# Patient Record
Sex: MALE | Race: OTHER | NOT HISPANIC OR LATINO | Employment: OTHER | URBAN - METROPOLITAN AREA
[De-identification: names, ages, dates, MRNs, and addresses within clinical notes are randomized per-mention and may not be internally consistent; named-entity substitution may affect disease eponyms.]

---

## 2018-03-01 ENCOUNTER — HOSPITAL ENCOUNTER (OUTPATIENT)
Dept: RADIOLOGY | Facility: HOSPITAL | Age: 58
Discharge: HOME | End: 2018-03-01
Attending: INTERNAL MEDICINE
Payer: COMMERCIAL

## 2018-03-01 ENCOUNTER — LAB (OUTPATIENT)
Dept: LAB | Facility: HOSPITAL | Age: 58
End: 2018-03-01
Attending: INTERNAL MEDICINE
Payer: COMMERCIAL

## 2018-03-01 ENCOUNTER — TRANSCRIBE ORDERS (OUTPATIENT)
Dept: ADMINISTRATIVE | Facility: HOSPITAL | Age: 58
End: 2018-03-01

## 2018-03-01 DIAGNOSIS — M54.9 BACK PAIN, UNSPECIFIED BACK LOCATION, UNSPECIFIED BACK PAIN LATERALITY, UNSPECIFIED CHRONICITY: ICD-10-CM

## 2018-03-01 DIAGNOSIS — M54.9 BACK PAIN, UNSPECIFIED BACK LOCATION, UNSPECIFIED BACK PAIN LATERALITY, UNSPECIFIED CHRONICITY: Primary | ICD-10-CM

## 2018-03-01 DIAGNOSIS — Z00.00 ROUTINE GENERAL MEDICAL EXAMINATION AT A HEALTH CARE FACILITY: ICD-10-CM

## 2018-03-01 DIAGNOSIS — D72.829 LEUKOCYTOSIS, UNSPECIFIED TYPE: ICD-10-CM

## 2018-03-01 DIAGNOSIS — M25.552 LEFT HIP PAIN: ICD-10-CM

## 2018-03-01 LAB
25(OH)D3 SERPL-MCNC: 17.4 NG/ML (ref 30–100)
ALBUMIN SERPL BCP-MCNC: 3.9 G/DL (ref 3.5–5)
ALP SERPL-CCNC: 79 U/L (ref 46–116)
ALT SERPL W P-5'-P-CCNC: 34 U/L (ref 12–78)
ANION GAP SERPL CALCULATED.3IONS-SCNC: 7 MMOL/L (ref 4–13)
AST SERPL W P-5'-P-CCNC: 25 U/L (ref 5–45)
BASOPHILS # BLD MANUAL: 0 THOUSAND/UL (ref 0–0.1)
BASOPHILS NFR MAR MANUAL: 0 % (ref 0–1)
BILIRUB SERPL-MCNC: 1.4 MG/DL (ref 0.2–1)
BUN SERPL-MCNC: 9 MG/DL (ref 5–25)
CALCIUM SERPL-MCNC: 8.9 MG/DL (ref 8.3–10.1)
CHLORIDE SERPL-SCNC: 103 MMOL/L (ref 100–108)
CHOLEST SERPL-MCNC: 215 MG/DL (ref 50–200)
CO2 SERPL-SCNC: 30 MMOL/L (ref 21–32)
CREAT SERPL-MCNC: 0.93 MG/DL (ref 0.6–1.3)
EOSINOPHIL # BLD MANUAL: 0 THOUSAND/UL (ref 0–0.4)
EOSINOPHIL NFR BLD MANUAL: 0 % (ref 0–6)
ERYTHROCYTE [DISTWIDTH] IN BLOOD BY AUTOMATED COUNT: 13 % (ref 11.6–15.1)
EST. AVERAGE GLUCOSE BLD GHB EST-MCNC: 108 MG/DL
GFR SERPL CREATININE-BSD FRML MDRD: 92 ML/MIN/1.73SQ M
GLUCOSE P FAST SERPL-MCNC: 95 MG/DL (ref 65–99)
HBA1C MFR BLD: 5.4 % (ref 4.2–6.3)
HCT VFR BLD AUTO: 44.9 % (ref 36.5–49.3)
HDLC SERPL-MCNC: 39 MG/DL (ref 40–60)
HGB BLD-MCNC: 15.3 G/DL (ref 12–17)
LDLC SERPL CALC-MCNC: 145 MG/DL (ref 0–100)
LG PLATELETS BLD QL SMEAR: PRESENT
LYMPHOCYTES # BLD AUTO: 18.4 THOUSAND/UL (ref 0.6–4.47)
LYMPHOCYTES # BLD AUTO: 73 % (ref 14–44)
MCH RBC QN AUTO: 29.1 PG (ref 26.8–34.3)
MCHC RBC AUTO-ENTMCNC: 34.1 G/DL (ref 31.4–37.4)
MCV RBC AUTO: 85 FL (ref 82–98)
MONOCYTES # BLD AUTO: 0.5 THOUSAND/UL (ref 0–1.22)
MONOCYTES NFR BLD: 2 % (ref 4–12)
NEUTROPHILS # BLD MANUAL: 4.03 THOUSAND/UL (ref 1.85–7.62)
NEUTS SEG NFR BLD AUTO: 16 % (ref 43–75)
OVALOCYTES BLD QL SMEAR: PRESENT
PLATELET # BLD AUTO: 194 THOUSANDS/UL (ref 149–390)
PLATELET BLD QL SMEAR: ADEQUATE
PMV BLD AUTO: 10.8 FL (ref 8.9–12.7)
POTASSIUM SERPL-SCNC: 4.3 MMOL/L (ref 3.5–5.3)
PROT SERPL-MCNC: 7.7 G/DL (ref 6.4–8.2)
RBC # BLD AUTO: 5.26 MILLION/UL (ref 3.88–5.62)
SODIUM SERPL-SCNC: 140 MMOL/L (ref 136–145)
TOTAL CELLS COUNTED SPEC: 100
TRIGL SERPL-MCNC: 153 MG/DL
VARIANT LYMPHS # BLD AUTO: 9 %
VIT B12 SERPL-MCNC: 413 PG/ML (ref 100–900)
WBC # BLD AUTO: 25.2 THOUSAND/UL (ref 4.31–10.16)

## 2018-03-01 PROCEDURE — 82306 VITAMIN D 25 HYDROXY: CPT

## 2018-03-01 PROCEDURE — 83036 HEMOGLOBIN GLYCOSYLATED A1C: CPT

## 2018-03-01 PROCEDURE — 80061 LIPID PANEL: CPT

## 2018-03-01 PROCEDURE — 36415 COLL VENOUS BLD VENIPUNCTURE: CPT

## 2018-03-01 PROCEDURE — 73522 X-RAY EXAM HIPS BI 3-4 VIEWS: CPT

## 2018-03-01 PROCEDURE — 80053 COMPREHEN METABOLIC PANEL: CPT

## 2018-03-01 PROCEDURE — 72110 X-RAY EXAM L-2 SPINE 4/>VWS: CPT

## 2018-03-01 PROCEDURE — 85007 BL SMEAR W/DIFF WBC COUNT: CPT

## 2018-03-01 PROCEDURE — 82607 VITAMIN B-12: CPT

## 2018-03-01 PROCEDURE — 85027 COMPLETE CBC AUTOMATED: CPT

## 2018-03-19 ENCOUNTER — APPOINTMENT (OUTPATIENT)
Dept: LAB | Facility: HOSPITAL | Age: 58
End: 2018-03-19
Attending: INTERNAL MEDICINE
Payer: COMMERCIAL

## 2018-03-19 ENCOUNTER — TRANSCRIBE ORDERS (OUTPATIENT)
Dept: ADMINISTRATIVE | Facility: HOSPITAL | Age: 58
End: 2018-03-19

## 2018-03-19 DIAGNOSIS — R23.0 CYANOSIS: ICD-10-CM

## 2018-03-19 DIAGNOSIS — D72.829 LEUKOCYTOSIS, UNSPECIFIED TYPE: ICD-10-CM

## 2018-03-19 DIAGNOSIS — D72.829 LEUKOCYTOSIS, UNSPECIFIED TYPE: Primary | ICD-10-CM

## 2018-03-19 DIAGNOSIS — R79.9 BLOOD CHEMISTRY ABNORMALITY: ICD-10-CM

## 2018-03-19 LAB
BASOPHILS # BLD MANUAL: 0 THOUSAND/UL (ref 0–0.1)
BASOPHILS NFR MAR MANUAL: 0 % (ref 0–1)
EOSINOPHIL # BLD MANUAL: 0 THOUSAND/UL (ref 0–0.4)
EOSINOPHIL NFR BLD MANUAL: 0 % (ref 0–6)
ERYTHROCYTE [DISTWIDTH] IN BLOOD BY AUTOMATED COUNT: 13.1 % (ref 11.6–15.1)
HCT VFR BLD AUTO: 45.1 % (ref 36.5–49.3)
HGB BLD-MCNC: 15.4 G/DL (ref 12–17)
LYMPHOCYTES # BLD AUTO: 22.14 THOUSAND/UL (ref 0.6–4.47)
LYMPHOCYTES # BLD AUTO: 81 % (ref 14–44)
MCH RBC QN AUTO: 28.9 PG (ref 26.8–34.3)
MCHC RBC AUTO-ENTMCNC: 34.1 G/DL (ref 31.4–37.4)
MCV RBC AUTO: 85 FL (ref 82–98)
MONOCYTES # BLD AUTO: 0.82 THOUSAND/UL (ref 0–1.22)
MONOCYTES NFR BLD: 3 % (ref 4–12)
NEUTROPHILS # BLD MANUAL: 4.37 THOUSAND/UL (ref 1.85–7.62)
NEUTS SEG NFR BLD AUTO: 16 % (ref 43–75)
PLATELET # BLD AUTO: 183 THOUSANDS/UL (ref 149–390)
PLATELET BLD QL SMEAR: ADEQUATE
PMV BLD AUTO: 11.5 FL (ref 8.9–12.7)
RBC # BLD AUTO: 5.32 MILLION/UL (ref 3.88–5.62)
RBC MORPH BLD: NORMAL
TOTAL CELLS COUNTED SPEC: 100
WBC # BLD AUTO: 27.33 THOUSAND/UL (ref 4.31–10.16)

## 2018-03-19 PROCEDURE — 36415 COLL VENOUS BLD VENIPUNCTURE: CPT

## 2018-03-19 PROCEDURE — 85007 BL SMEAR W/DIFF WBC COUNT: CPT

## 2018-03-19 PROCEDURE — 85027 COMPLETE CBC AUTOMATED: CPT

## 2018-03-23 ENCOUNTER — APPOINTMENT (OUTPATIENT)
Dept: LAB | Facility: HOSPITAL | Age: 58
End: 2018-03-23
Attending: INTERNAL MEDICINE
Payer: COMMERCIAL

## 2018-03-23 DIAGNOSIS — R23.0 CYANOSIS: ICD-10-CM

## 2018-03-23 DIAGNOSIS — D72.829 LEUKOCYTOSIS, UNSPECIFIED TYPE: ICD-10-CM

## 2018-03-23 DIAGNOSIS — R79.9 BLOOD CHEMISTRY ABNORMALITY: ICD-10-CM

## 2018-03-23 LAB
BASOPHILS # BLD MANUAL: 0 THOUSAND/UL (ref 0–0.1)
BASOPHILS NFR MAR MANUAL: 0 % (ref 0–1)
EOSINOPHIL # BLD MANUAL: 0 THOUSAND/UL (ref 0–0.4)
EOSINOPHIL NFR BLD MANUAL: 0 % (ref 0–6)
ERYTHROCYTE [DISTWIDTH] IN BLOOD BY AUTOMATED COUNT: 13 % (ref 11.6–15.1)
HCT VFR BLD AUTO: 44.1 % (ref 36.5–49.3)
HGB BLD-MCNC: 14.7 G/DL (ref 12–17)
LYMPHOCYTES # BLD AUTO: 23.96 THOUSAND/UL (ref 0.6–4.47)
LYMPHOCYTES # BLD AUTO: 85 % (ref 14–44)
MCH RBC QN AUTO: 28.9 PG (ref 26.8–34.3)
MCHC RBC AUTO-ENTMCNC: 33.3 G/DL (ref 31.4–37.4)
MCV RBC AUTO: 87 FL (ref 82–98)
MONOCYTES # BLD AUTO: 0 THOUSAND/UL (ref 0–1.22)
MONOCYTES NFR BLD: 0 % (ref 4–12)
NEUTROPHILS # BLD MANUAL: 3.66 THOUSAND/UL (ref 1.85–7.62)
NEUTS SEG NFR BLD AUTO: 13 % (ref 43–75)
NRBC BLD AUTO-RTO: 0 /100 WBCS
PLATELET # BLD AUTO: 184 THOUSANDS/UL (ref 149–390)
PLATELET BLD QL SMEAR: ADEQUATE
PMV BLD AUTO: 11.2 FL (ref 8.9–12.7)
RBC # BLD AUTO: 5.08 MILLION/UL (ref 3.88–5.62)
RBC MORPH BLD: NORMAL
TOTAL CELLS COUNTED SPEC: 100
VARIANT LYMPHS # BLD AUTO: 2 %
WBC # BLD AUTO: 28.19 THOUSAND/UL (ref 4.31–10.16)

## 2018-03-23 PROCEDURE — 88185 FLOWCYTOMETRY/TC ADD-ON: CPT

## 2018-03-23 PROCEDURE — 36415 COLL VENOUS BLD VENIPUNCTURE: CPT

## 2018-03-23 PROCEDURE — 88184 FLOWCYTOMETRY/ TC 1 MARKER: CPT

## 2018-03-23 PROCEDURE — 85027 COMPLETE CBC AUTOMATED: CPT

## 2018-03-23 PROCEDURE — 85007 BL SMEAR W/DIFF WBC COUNT: CPT

## 2018-03-26 ENCOUNTER — HOSPITAL ENCOUNTER (OUTPATIENT)
Dept: MRI IMAGING | Facility: HOSPITAL | Age: 58
Discharge: HOME | End: 2018-03-26
Attending: INTERNAL MEDICINE

## 2018-03-26 DIAGNOSIS — D72.829 LEUKOCYTOSIS, UNSPECIFIED TYPE: ICD-10-CM

## 2018-03-26 DIAGNOSIS — M25.552 LEFT HIP PAIN: ICD-10-CM

## 2018-03-29 LAB — SCAN RESULT: NORMAL

## 2018-04-02 ENCOUNTER — OFFICE VISIT (OUTPATIENT)
Dept: HEMATOLOGY ONCOLOGY | Facility: CLINIC | Age: 58
End: 2018-04-02
Payer: COMMERCIAL

## 2018-04-02 VITALS
RESPIRATION RATE: 16 BRPM | BODY MASS INDEX: 25.52 KG/M2 | OXYGEN SATURATION: 98 % | TEMPERATURE: 98.5 F | SYSTOLIC BLOOD PRESSURE: 138 MMHG | HEIGHT: 67 IN | HEART RATE: 64 BPM | WEIGHT: 162.6 LBS | DIASTOLIC BLOOD PRESSURE: 78 MMHG

## 2018-04-02 DIAGNOSIS — C91.10 CHRONIC LYMPHOCYTIC LEUKEMIA OF B-CELL TYPE NOT HAVING ACHIEVED REMISSION (HCC): Primary | ICD-10-CM

## 2018-04-02 PROCEDURE — 99245 OFF/OP CONSLTJ NEW/EST HI 55: CPT | Performed by: INTERNAL MEDICINE

## 2018-04-02 RX ORDER — ASPIRIN 81 MG/1
81 TABLET ORAL DAILY
COMMUNITY

## 2018-04-02 RX ORDER — ATENOLOL 25 MG/1
25 TABLET ORAL DAILY
COMMUNITY

## 2018-04-02 NOTE — LETTER
April 2, 2018     [address]  [address] [facility] [address]  [place] [address]  [address]    Patient: Dwight Kasper   YOB: 1960   Date of Visit: 4/2/2018       Dear Dr Alcantar Cristhian: Thank you for referring Dee Blank to me for evaluation  Below are my notes for this consultation  If you have questions, please do not hesitate to call me  I look forward to following your patient along with you  Sincerely,        Chavez Merlos MD        CC: No Recipients  Chavez Merlos MD  4/2/2018  6:11 PM  Sign at close encounter  Consultation - Medical Oncology   Dwight Kasper 56 y o  male MRN: [id number]  Unit/Bed#:  Encounter: [id number]      Reason for Consult:  Chronic lymphocytic leukemia  HPI: Dwight Kasper is a 56y o  year old male who presents with leukocytosis with lymphocytosis  On flow cytology has CD 19 positive, CD 20 positive and CD 23 positive B-cell CLL  This was an incidental finding  Patient is not symptomatic from this condition  Patient was seen by the physician for left hip pain that was going on off and on for the last 8 months  Activity will aggravate it  Rest will help  Patient had blood tests and MRI scan of left hip  Blood tests, CBC D and CMP favored CLL  MRI scan of left hip showed mild greater trochanteric bursitis  Previous history of hypertension, dyslipidemia and coronary artery disease  He has 1 stent in a small artery of the heart  He was controlling his blood pressure and dyslipidemia with diet but now is back on atenolol and he plans to take baby aspirin and if he takes Advil for his left hip that will not be within 4 hour of aspirin  Recent episode of throat infection that got better without antibiotics    ROS:  04/02/18 Reviewed 13 systems:  Presently no headaches, seizures, dizziness, diplopia, dysphagia, hoarseness, chest pain, palpitations, shortness of breath, cough, hemoptysis, abdominal pain, nausea, vomiting, change in bowel habits, melena, hematuria, fever, chills, bleeding, bone pains, skin rash, weight loss,   tiredness , weakness, numbness, claudication and gait problem  No frequent infections  No hot flashes  Not unusually sensitive to heat or cold  No swelling of the ankles  No swollen glands  Patient is anxious  Other symptoms are in HPI        Historical Information   Past Medical History:   Diagnosis Date    Abnormal coronary angiogram      History reviewed  No pertinent surgical history  Social History   History   Alcohol Use No     History   Drug Use No     History   Smoking Status    Never Smoker   Smokeless Tobacco    Never Used     Family History:   Family History   Problem Relation Age of Onset   [facility] Breast cancer Mother     Heart disease Mother     No Known Problems Father     Liver disease Brother          Current Outpatient Prescriptions:     aspirin (ECOTRIN LOW STRENGTH) 81 mg EC tablet, Take 81 mg by mouth daily, Disp: , Rfl:     atenolol (TENORMIN) 25 mg tablet, Take 25 mg by mouth daily, Disp: , Rfl:     cholecalciferol (VITAMIN D3) 1,000 units tablet, Take 1,000 Units by mouth daily, Disp: , Rfl:     No Known Allergies  @ ROS@  Physical Exam:  Vitals:    04/02/18 1600   BP: 138/78   BP Location: Left arm   Cuff Size: Adult   Pulse: 64   Resp: 16   Temp: 98 5 °F (36 9 °C)   TempSrc: Tympanic   SpO2: 98%   Weight: 73 8 kg (162 lb 9 6 oz)   Height: 5' 7" (1 702 m)     Alert, oriented, not in distress, no icterus, no oral thrush, no palpable neck mass, clear lung fields, regular heart rate, abdomen  soft and non tender, no palpable abdominal mass, no ascites, no edema of ankles, no calf tenderness, no focal neurological deficit, no skin rash, no palpable lymphadenopathy, good arterial pulses, no clubbing  Patient is anxious  Performance status 1  Lab Results: I have reviewed all pertinent labs    LABS:  Results for orders placed or performed in visit on 03/23/18   Leukemia/Lymphoma flow cytometry   Result Value Ref Range    Scan Result SEE WRITTEN REPORT    CBC and differential   Order: [id number]   Status:  Final result   Visible to patient:  No (Inaccessible in [address]) Next appt:  04/05/2018 at 09:30 AM in Radiology ([address] [place] [address]) Dx:  Cyanosis; Blood chemistry abnormality    Ref Range & Units 3/23/18 12:05 PM 3/19/18  9:57 AM 3/1/18  9:46 AM    WBC 4 31 - 10 16 Thousand/uL 28 19   27 33   25 20      RBC 3 88 - 5 62 Million/uL 5 08  5 32  5 26     Hemoglobin 12 0 - 17 0 g/dL 14 7  15 4  15 3     Hematocrit 36 5 - 49 3 % 44 1  45 1  44 9     MCV 82 - 98 fL 87  85  85     MCH 26 8 - 34 3 pg 28 9  28 9  29 1     MCHC 31 4 - 37 4 g/dL 33 3  34 1  34 1     RDW 11 6 - 15 1 % 13 0  13 1  13 0     MPV 8 9 - 12 7 fL 11 2  11 5  10 8     Platelets 770 - 677 Thousands/uL 184  183  194     nRBC /100 WBCs 0      Comments: This is an appended report  Jacy Alexander results have been appended to a previously preliminary verified report  Narrative     This is an appended report  Jacy Alexander results have been appended to a previously verified report  Specimen Collected: 03/23/18 12:05 PM Last Resulted: 03/23/18  1:17 PM                       Manual Differential(PHLEBS Do Not Order)   Order: [id number] - Reflex for Order [id number]   Status:  Final result   Visible to patient:  No (Inaccessible in [address]) Next appt:  04/05/2018 at 09:30 AM in Radiology ([address] [place] [address]) Dx:  Cyanosis; Blood chemistry abnormality         Ref Range & Units 3/23/18 12:05 PM 3/19/18  9:57 AM 3/1/18  9:46 AM    Segmented % 43 - 75 % 13   16   16      Lymphocytes % 14 - 44 % 85   81   73      Monocytes % 4 - 12 % 0   3   2      Eosinophils % 0 - 6 % 0  0  0     Basophils % 0 - 1 % 0  0  0     Atypical Lymphocytes % <=0 % 2    <=0 %" class="[id number]">  9      Absolute Neutrophils 1 85 - 7 62 Thousand/uL 3 66  4 37  4 03     Lymphocytes Absolute 0 60 - 4 47 Thousand/uL 23 96   22 14   18 40      Monocytes Absolute 0 00 - 1 22 Thousand/uL 0 00  0 82  0 50     Eosinophils Absolute 0 00 - 0 40 Thousand/uL 0 00  0 00  0 00     Basophils Absolute 0 00 - 0 10 Thousand/uL 0 00  0 00  0 00     Total Counted  100  100  100     RBC Morphology  Normal  Normal      Platelet Estimate Adequate  Adequate   Adequate   Adequate       Specimen Collected: 03/23/18 12:05 PM Last Resulted: 03/23/18  1:17 PM              Comprehensive metabolic panel   Order: [id number]   Status:  Final result   Visible to patient:  No (Inaccessible in [address]) Next appt:  04/05/2018 at 09:30 AM in Radiology ([address] [place] [address]) Dx:  Routine general medical examination a  Ref Range & Units 3/1/18  9:46 AM Flag   Sodium 136 - 145 mmol/L 140     Potassium 3 5 - 5 3 mmol/L 4 3     Chloride 100 - 108 mmol/L 103     CO2 21 - 32 mmol/L 30     Anion Gap 4 - 13 mmol/L 7     BUN 5 - 25 mg/dL 9     Creatinine 0 60 - 1 30 mg/dL 0 93     Comments: Standardized to IDMS reference method   Glucose, Fasting 65 - 99 mg/dL 95     Comments:    Specimen collection should occur prior to Sulfasalazine administration due to the potential for falsely depressed results  Specimen collection should occur prior to Sulfapyridine administration due to the potential for falsely elevated results  Calcium 8 3 - 10 1 mg/dL 8 9     AST 5 - 45 U/L 25     Comments:    Specimen collection should occur prior to Sulfasalazine administration due to the potential for falsely depressed results  ALT 12 - 78 U/L 34     Comments:    Specimen collection should occur prior to Sulfasalazine administration due to the potential for falsely depressed results  Alkaline Phosphatase 46 - 116 U/L 79     Total Protein 6 4 - 8 2 g/dL 7 7     Albumin 3 5 - 5 0 g/dL 3 9     Total Bilirubin 0 20 - 1 00 mg/dL 1 40   H    eGFR ml/min/1 73sq m 92                     Imaging Studies: I have personally reviewed pertinent reports  Pathology, and Other Studies: I have personally reviewed pertinent reports          Assessment and Plan:   Katharine Amaya is a 56y o  year old male who presents with leukocytosis with lymphocytosis  On flow cytology has CD 19 positive, CD 20 positive and CD 23 positive B-cell CLL  This was an incidental finding  Patient is not symptomatic from this condition  Patient was seen by the physician for left hip pain that was going on off and on for the last 8 months  Activity will aggravate it  Rest will help  Patient had blood tests and MRI scan of left hip  Blood tests, CBC D and CMP favored CLL  MRI scan of left hip showed mild greater trochanteric bursitis  Previous history of hypertension, dyslipidemia and coronary artery disease  He has 1 stent in a small artery of the heart  He was controlling his blood pressure and dyslipidemia with diet but now is back on atenolol and he plans to take baby aspirin and if he takes Advil for his left hip that will not be within 4 hour of aspirin  Recent episode of throat infection that got better without antibiotics  Physical examination and test results are as recorded and discussed  Patient has stage 0 CLL  Discussed stages of CLL  Discussed surveillance versus treatment  Discussed prognostic FISH panel  Patient will have additional blood tests as ordered below and also ultrasound of abdomen  Patient states he has undescended testicle and that was checked by a specialist couple of years ago and was told there was no cancer  He understands increased risk of cancer in undescended testicle  Present plan is surveillance for CLL  Condition and plan discussed  Questions answered  To be re-evaluated after test results that I ordered  1  Chronic lymphocytic leukemia of B-cell type not having achieved remission (HCC)    - Beta 2 microglobuline, serum; Future  - LD,Blood; Future  - Uric acid; Future  - CLL profile, fish  - US abdomen complete; Future  - Reticulocytes; Future  - Protein electrophoresis, serum; Future  - IgG, IgA, IgM; Future      Patient voiced understanding and agreement in the discussion  Counseling / Coordination of Care:  Spent 85 minutes with patient and his wife discussing total picture and providing support and counseling  Also coordination of care    Greater than 50% of total time was spent with the patient and / or family counseling and / or coordination of care

## 2018-04-02 NOTE — PROGRESS NOTES
Consultation - Medical Oncology   Kimberlyn Ray 56 y o  male MRN: [id number]  Unit/Bed#:  Encounter: [id number]      Reason for Consult:  Chronic lymphocytic leukemia  HPI: Kimberlyn Ray is a 56y o  year old male who presents with leukocytosis with lymphocytosis  On flow cytology has CD 19 positive, CD 20 positive and CD 23 positive B-cell CLL  This was an incidental finding  Patient is not symptomatic from this condition  Patient was seen by the physician for left hip pain that was going on off and on for the last 8 months  Activity will aggravate it  Rest will help  Patient had blood tests and MRI scan of left hip  Blood tests, CBC D and CMP favored CLL  MRI scan of left hip showed mild greater trochanteric bursitis  Previous history of hypertension, dyslipidemia and coronary artery disease  He has 1 stent in a small artery of the heart  He was controlling his blood pressure and dyslipidemia with diet but now is back on atenolol and he plans to take baby aspirin and if he takes Advil for his left hip that will not be within 4 hour of aspirin  Recent episode of throat infection that got better without antibiotics  ROS:  04/02/18 Reviewed 13 systems:  Presently no headaches, seizures, dizziness, diplopia, dysphagia, hoarseness, chest pain, palpitations, shortness of breath, cough, hemoptysis, abdominal pain, nausea, vomiting, change in bowel habits, melena, hematuria, fever, chills, bleeding, bone pains, skin rash, weight loss,   tiredness , weakness, numbness, claudication and gait problem  No frequent infections  No hot flashes  Not unusually sensitive to heat or cold  No swelling of the ankles  No swollen glands  Patient is anxious  Other symptoms are in HPI        Historical Information   Past Medical History:   Diagnosis Date    Abnormal coronary angiogram      History reviewed  No pertinent surgical history    Social History   History   Alcohol Use No     History   Drug Use No History   Smoking Status    Never Smoker   Smokeless Tobacco    Never Used     Family History:   Family History   Problem Relation Age of Onset   [facility] Breast [facility] Mother     Heart disease Mother     No Known Problems Father     Liver disease Brother          Current Outpatient Prescriptions:     aspirin (ECOTRIN LOW STRENGTH) 81 mg EC tablet, Take 81 mg by mouth daily, Disp: , Rfl:     atenolol (TENORMIN) 25 mg tablet, Take 25 mg by mouth daily, Disp: , Rfl:     cholecalciferol (VITAMIN D3) 1,000 units tablet, Take 1,000 Units by mouth daily, Disp: , Rfl:     No Known Allergies  @ ROS@  Physical Exam:  Vitals:    04/02/18 1600   BP: 138/78   BP Location: Left arm   Cuff Size: Adult   Pulse: 64   Resp: 16   Temp: 98 5 °F (36 9 °C)   TempSrc: Tympanic   SpO2: 98%   Weight: 73 8 kg (162 lb 9 6 oz)   Height: 5' 7" (1 702 m)     Alert, oriented, not in distress, no icterus, no oral thrush, no palpable neck mass, clear lung fields, regular heart rate, abdomen  soft and non tender, no palpable abdominal mass, no ascites, no edema of ankles, no calf tenderness, no focal neurological deficit, no skin rash, no palpable lymphadenopathy, good arterial pulses, no clubbing  Patient is anxious  Performance status 1  Lab Results: I have reviewed all pertinent labs  LABS:  Results for orders placed or performed in visit on 03/23/18   Leukemia/Lymphoma flow cytometry   Result Value Ref Range    Scan Result SEE WRITTEN REPORT    CBC and differential   Order: [id number]   Status:  Final result   Visible to patient:  No (Inaccessible in [address]) Next appt:  04/05/2018 at 09:30 AM in Radiology ([address] [place] [address]) Dx:  Cyanosis; Blood chemistry abnormality         Ref Range & Units 3/23/18 12:05 PM 3/19/18  9:57 AM 3/1/18  9:46 AM    WBC 4 31 - 10 16 Thousand/uL 28 19   27 33   25 20      RBC 3 88 - 5 62 Million/uL 5 08  5 32  5 26     Hemoglobin 12 0 - 17 0 g/dL 14 7  15 4  15 3     Hematocrit 36 5 - 49 3 % 44 1  45 1  44 9 MCV 82 - 98 fL 87  85  85     MCH 26 8 - 34 3 pg 28 9  28 9  29 1     MCHC 31 4 - 37 4 g/dL 33 3  34 1  34 1     RDW 11 6 - 15 1 % 13 0  13 1  13 0     MPV 8 9 - 12 7 fL 11 2  11 5  10 8     Platelets 623 - 259 Thousands/uL 184  183  194     nRBC /100 WBCs 0      Comments: This is an appended report  Orifavio Tommy results have been appended to a previously preliminary verified report  Narrative     This is an appended report  [facility] results have been appended to a previously verified report  Specimen Collected: 03/23/18 12:05 PM Last Resulted: 03/23/18  1:17 PM                       Manual Differential(PHLEBS Do Not Order)   Order: [id number] - Reflex for Order [id number]   Status:  Final result   Visible to patient:  No (Inaccessible in [address]) Next appt:  04/05/2018 at 09:30 AM in Radiology ([address] [place] [address]) Dx:  Cyanosis; Blood chemistry abnormality         Ref Range & Units 3/23/18 12:05 PM 3/19/18  9:57 AM 3/1/18  9:46 AM    Segmented % 43 - 75 % 13   16   16      Lymphocytes % 14 - 44 % 85   81   73      Monocytes % 4 - 12 % 0   3   2      Eosinophils % 0 - 6 % 0  0  0     Basophils % 0 - 1 % 0  0  0     Atypical Lymphocytes % <=0 % 2    <=0 %" class="rz_f [id number]">  9      Absolute Neutrophils 1 85 - 7 62 Thousand/uL 3 66  4 37  4 03     Lymphocytes Absolute 0 60 - 4 47 Thousand/uL 23 96   22 14   18 40      Monocytes Absolute 0 00 - 1 22 Thousand/uL 0 00  0 82  0 50     Eosinophils Absolute 0 00 - 0 40 Thousand/uL 0 00  0 00  0 00     Basophils Absolute 0 00 - 0 10 Thousand/uL 0 00  0 00  0 00     Total Counted  100  100  100     RBC Morphology  Normal  Normal      Platelet Estimate Adequate  Adequate   Adequate   Adequate       Specimen Collected: 03/23/18 12:05 PM Last Resulted: 03/23/18  1:17 PM              Comprehensive metabolic panel   Order: [id number]   Status:  Final result   Visible to patient:  No (Inaccessible in MyChart) Next appt:  04/05/2018 at 09:30 AM in Radiology ([address] [place] [address]) Dx:  Routine general medical examination a  Ref Range & Units 3/1/18  9:46 AM Flag   Sodium 136 - 145 mmol/L 140     Potassium 3 5 - 5 3 mmol/L 4 3     Chloride 100 - 108 mmol/L 103     CO2 21 - 32 mmol/L 30     Anion Gap 4 - 13 mmol/L 7     BUN 5 - 25 mg/dL 9     Creatinine 0 60 - 1 30 mg/dL 0 93     Comments: Standardized to IDMS reference method   Glucose, Fasting 65 - 99 mg/dL 95     Comments:    Specimen collection should occur prior to Sulfasalazine administration due to the potential for falsely depressed results  Specimen collection should occur prior to Sulfapyridine administration due to the potential for falsely elevated results  Calcium 8 3 - 10 1 mg/dL 8 9     AST 5 - 45 U/L 25     Comments:    Specimen collection should occur prior to Sulfasalazine administration due to the potential for falsely depressed results  ALT 12 - 78 U/L 34     Comments:    Specimen collection should occur prior to Sulfasalazine administration due to the potential for falsely depressed results  Alkaline Phosphatase 46 - 116 U/L 79     Total Protein 6 4 - 8 2 g/dL 7 7     Albumin 3 5 - 5 0 g/dL 3 9     Total Bilirubin 0 20 - 1 00 mg/dL 1 40   H    eGFR ml/min/1 73sq m 92                     Imaging Studies: I have personally reviewed pertinent reports  Pathology, and Other Studies: I have personally reviewed pertinent reports  Assessment and Plan:   Monika Montez is a 56y o  year old male who presents with leukocytosis with lymphocytosis  On flow cytology has CD 19 positive, CD 20 positive and CD 23 positive B-cell CLL  This was an incidental finding  Patient is not symptomatic from this condition  Patient was seen by the physician for left hip pain that was going on off and on for the last 8 months  Activity will aggravate it  Rest will help  Patient had blood tests and MRI scan of left hip  Blood tests, CBC D and CMP favored CLL  MRI scan of left hip showed mild greater trochanteric bursitis    Previous history of hypertension, dyslipidemia and coronary artery disease  He has 1 stent in a small artery of the heart  He was controlling his blood pressure and dyslipidemia with diet but now is back on atenolol and he plans to take baby aspirin and if he takes Advil for his left hip that will not be within 4 hour of aspirin  Recent episode of throat infection that got better without antibiotics  Physical examination and test results are as recorded and discussed  Patient has stage 0 CLL  Discussed stages of CLL  Discussed surveillance versus treatment  Discussed prognostic FISH panel  Patient will have additional blood tests as ordered below and also ultrasound of abdomen  Patient states he has undescended testicle and that was checked by a specialist couple of years ago and was told there was no cancer  He understands increased risk of cancer in undescended testicle  Present plan is surveillance for CLL  Condition and plan discussed  Questions answered  To be re-evaluated after test results that I ordered  1  Chronic lymphocytic leukemia of B-cell type not having achieved remission (HCC)    - Beta 2 microglobuline, serum; Future  - LD,Blood; Future  - Uric acid; Future  - CLL profile, fish  - US abdomen complete; Future  - Reticulocytes; Future  - Protein electrophoresis, serum; Future  - IgG, IgA, IgM; Future      Patient voiced understanding and agreement in the discussion  Counseling / Coordination of Care:  Spent 85 minutes with patient and his wife discussing total picture and providing support and counseling  Also coordination of care    Greater than 50% of total time was spent with the patient and / or family counseling and / or coordination of care

## 2018-04-03 ENCOUNTER — TELEPHONE (OUTPATIENT)
Dept: HEMATOLOGY ONCOLOGY | Facility: CLINIC | Age: 58
End: 2018-04-03

## 2018-04-05 ENCOUNTER — APPOINTMENT (OUTPATIENT)
Dept: LAB | Facility: HOSPITAL | Age: 58
End: 2018-04-05
Attending: INTERNAL MEDICINE
Payer: COMMERCIAL

## 2018-04-05 ENCOUNTER — HOSPITAL ENCOUNTER (OUTPATIENT)
Dept: ULTRASOUND IMAGING | Facility: HOSPITAL | Age: 58
Discharge: HOME | End: 2018-04-05
Attending: INTERNAL MEDICINE
Payer: COMMERCIAL

## 2018-04-05 DIAGNOSIS — C91.10 CHRONIC LYMPHOCYTIC LEUKEMIA OF B-CELL TYPE NOT HAVING ACHIEVED REMISSION (HCC): ICD-10-CM

## 2018-04-05 LAB
IGA SERPL-MCNC: 181 MG/DL (ref 70–400)
IGG SERPL-MCNC: 1460 MG/DL (ref 700–1600)
IGM SERPL-MCNC: 48 MG/DL (ref 40–230)
LDH SERPL-CCNC: 119 U/L (ref 81–234)
RETICS # AUTO: NORMAL 10*3/UL (ref 14356–105094)
RETICS # CALC: 1.02 % (ref 0.37–1.87)
URATE SERPL-MCNC: 7.2 MG/DL (ref 4.2–8)

## 2018-04-05 PROCEDURE — 82232 ASSAY OF BETA-2 PROTEIN: CPT

## 2018-04-05 PROCEDURE — 82784 ASSAY IGA/IGD/IGG/IGM EACH: CPT

## 2018-04-05 PROCEDURE — 84550 ASSAY OF BLOOD/URIC ACID: CPT

## 2018-04-05 PROCEDURE — 85045 AUTOMATED RETICULOCYTE COUNT: CPT

## 2018-04-05 PROCEDURE — 88367 INSITU HYBRIDIZATION AUTO: CPT | Performed by: INTERNAL MEDICINE

## 2018-04-05 PROCEDURE — 88373 M/PHMTRC ALYS ISHQUANT/SEMIQ: CPT | Performed by: INTERNAL MEDICINE

## 2018-04-05 PROCEDURE — 36415 COLL VENOUS BLD VENIPUNCTURE: CPT | Performed by: INTERNAL MEDICINE

## 2018-04-05 PROCEDURE — 76700 US EXAM ABDOM COMPLETE: CPT

## 2018-04-05 PROCEDURE — 84165 PROTEIN E-PHORESIS SERUM: CPT | Performed by: PATHOLOGY

## 2018-04-05 PROCEDURE — 84165 PROTEIN E-PHORESIS SERUM: CPT

## 2018-04-05 PROCEDURE — 83615 LACTATE (LD) (LDH) ENZYME: CPT

## 2018-04-07 LAB — B2 MICROGLOB SERPL-MCNC: 2 MG/L (ref 0.6–2.4)

## 2018-04-08 LAB
ALBUMIN SERPL ELPH-MCNC: 4.64 G/DL (ref 3.5–5)
ALBUMIN SERPL ELPH-MCNC: 61.9 % (ref 52–65)
ALPHA1 GLOB SERPL ELPH-MCNC: 0.2 G/DL (ref 0.1–0.4)
ALPHA1 GLOB SERPL ELPH-MCNC: 2.7 % (ref 2.5–5)
ALPHA2 GLOB SERPL ELPH-MCNC: 0.56 G/DL (ref 0.4–1.2)
ALPHA2 GLOB SERPL ELPH-MCNC: 7.4 % (ref 7–13)
BETA GLOB ABNORMAL SERPL ELPH-MCNC: 0.45 G/DL (ref 0.4–0.8)
BETA1 GLOB SERPL ELPH-MCNC: 6 % (ref 5–13)
BETA2 GLOB SERPL ELPH-MCNC: 5.7 % (ref 2–8)
BETA2+GAMMA GLOB SERPL ELPH-MCNC: 0.43 G/DL (ref 0.2–0.5)
GAMMA GLOB ABNORMAL SERPL ELPH-MCNC: 1.22 G/DL (ref 0.5–1.6)
GAMMA GLOB SERPL ELPH-MCNC: 16.3 % (ref 12–22)
IGG/ALB SER: 1.62 {RATIO} (ref 1.1–1.8)
PROT PATTERN SERPL ELPH-IMP: NORMAL
PROT SERPL-MCNC: 7.5 G/DL (ref 6.4–8.2)

## 2018-04-11 LAB — SCAN RESULT: NORMAL

## 2018-04-27 ENCOUNTER — TELEPHONE (OUTPATIENT)
Dept: HEMATOLOGY ONCOLOGY | Facility: CLINIC | Age: 58
End: 2018-04-27

## 2018-04-27 NOTE — TELEPHONE ENCOUNTER
Called and spoke to the patient regarding his blood work results from 4/5/18  All the patient's results were normal  Patient does not require anymore blood work orders prior to his 5/4/18 appointment  Patient verbally understood

## 2018-04-27 NOTE — TELEPHONE ENCOUNTER
Calling asking if lab that was done was OK  He has a follow up appointment in May, and wondered if anything needed to be repeated  He asked to speak with Dr Kathleen Perez

## 2018-05-02 ENCOUNTER — OFFICE VISIT (OUTPATIENT)
Dept: HEMATOLOGY ONCOLOGY | Facility: CLINIC | Age: 58
End: 2018-05-02
Payer: COMMERCIAL

## 2018-05-02 VITALS
RESPIRATION RATE: 15 BRPM | HEIGHT: 67 IN | TEMPERATURE: 97.7 F | WEIGHT: 157.4 LBS | OXYGEN SATURATION: 99 % | HEART RATE: 59 BPM | DIASTOLIC BLOOD PRESSURE: 74 MMHG | BODY MASS INDEX: 24.71 KG/M2 | SYSTOLIC BLOOD PRESSURE: 142 MMHG

## 2018-05-02 DIAGNOSIS — C91.10 CHRONIC LYMPHOCYTIC LEUKEMIA OF B-CELL TYPE NOT HAVING ACHIEVED REMISSION (HCC): Primary | ICD-10-CM

## 2018-05-02 PROCEDURE — 99214 OFFICE O/P EST MOD 30 MIN: CPT | Performed by: INTERNAL MEDICINE

## 2018-05-02 NOTE — PROGRESS NOTES
Follow-up visit  Nidia Horvath 56 y o  male MRN: [id number]  Unit/Bed#:  Encounter: [id number]        HPI:   Patient is here with his wife  Nidia Horvath is a 56y o  year old male  with the diagnosis of CLL  On fish test he has 17 P deletion  that is and unfavorable feature  He will be checked for Ig VH  He has some tiredness  Intentional weight loss because he is watching his diet  Patient was seen by the physician for left hip pain that was going on off and on for the last 8 months  Activity will aggravate it  Rest will help  Patient had blood tests and MRI scan of left hip  Blood tests, CBC D and CMP favored CLL  MRI scan of left hip showed mild greater trochanteric bursitis  Previous history of hypertension, dyslipidemia and coronary artery disease  He has 1 stent in a small artery of the heart  He was controlling his blood pressure and dyslipidemia with diet but now is back on atenolol and he plans to take baby aspirin and if he takes Advil for his left hip that will not be within 4 hour of aspirin  Recent episode of throat infection that got better without antibiotics  ROS:  05/02/18 Reviewed 13 systems:  Presently no headaches, seizures, dizziness, diplopia, dysphagia, hoarseness, chest pain, palpitations, shortness of breath, cough, hemoptysis, abdominal pain, nausea, vomiting, change in bowel habits, melena, hematuria, fever, chills, bleeding, bone pains, skin rash,  weakness, numbness, claudication and gait problem  No frequent infections  Not unusually sensitive to heat or cold  No swelling of the ankles  No swollen glands  Patient is anxious  Other symptoms are in HPI        Historical Information   Past Medical History:   Diagnosis Date    Abnormal coronary angiogram     Leukocytosis      History reviewed  No pertinent surgical history    Social History   History   Alcohol Use No     History   Drug Use No     History   Smoking Status    Never Smoker   Smokeless Tobacco  Never Used     Family History:   Family History   Problem Relation Age of Onset   24 [facility] Breast cancer Mother     Heart disease Mother     No Known Problems Father     Liver disease Brother          Current Outpatient Prescriptions:     aspirin (ECOTRIN LOW STRENGTH) 81 mg EC tablet, Take 81 mg by mouth daily, Disp: , Rfl:     atenolol (TENORMIN) 25 mg tablet, Take 25 mg by mouth daily, Disp: , Rfl:     cholecalciferol (VITAMIN D3) 1,000 units tablet, Take 1,000 Units by mouth daily, Disp: , Rfl:     No Known Allergies  @ ROS@  Physical Exam:  Vitals:    05/02/18 0800   BP: 142/74   BP Location: Left arm   Cuff Size: Adult   Pulse: 59   Resp: 15   Temp: 97 7 °F (36 5 °C)   TempSrc: Tympanic   SpO2: 99%   Weight: 71 4 kg (157 lb 6 4 oz)   Height: 5' 7" (1 702 m)     Alert, oriented, not in distress, no icterus, no oral thrush, no palpable neck mass, clear lung fields, regular heart rate, abdomen  soft and non tender, no palpable abdominal mass, no ascites, no edema of ankles, no calf tenderness, no focal neurological deficit, no skin rash, no palpable lymphadenopathy, good arterial pulses, no clubbing  Patient is anxious  Performance status 0  Lab Results: I have reviewed all pertinent labs    LABS:  Results for orders placed or performed in visit on 04/05/18   Beta 2 microglobuline, serum   Result Value Ref Range    Beta-2 Microglobulin 2 0 0 6 - 2 4 mg/L   LD,Blood   Result Value Ref Range     81 - 234 U/L   Uric acid   Result Value Ref Range    Uric Acid 7 2 4 2 - 8 0 mg/dL   Reticulocytes   Result Value Ref Range    Retic Ct Abs 52,700 14,356 - 105,094    Retic Ct Pct 1 02 0 37 - 1 87 %   Protein electrophoresis, serum   Result Value Ref Range    A/G Ratio 1 62 1 10 - 1 80    Albumin Electrophoresis 61 9 52 0 - 65 0 %    Albumin CONC 4 64 3 50 - 5 00 g/dl    Alpha 1 2 7 2 5 - 5 0 %    ALPHA 1 CONC 0 20 0 10 - 0 40 g/dL    Alpha 2 7 4 7 0 - 13 0 %    ALPHA 2 CONC 0 56 0 40 - 1 20 g/dL    Beta-1 6 0 5 0 - 13 0 %    BETA 1 CONC 0 45 0 40 - 0 80 g/dL    Beta-2 5 7 2 0 - 8 0 %    BETA 2 CONC 0 43 0 20 - 0 50 g/dL    Gamma Globulin 16 3 12 0 - 22 0 %    GAMMA CONC 1 22 0 50 - 1 60 g/dL    SPEP Interpretation       The serum total protein and albumin are within normal limits  No monoclonal bands noted  Reviewed by: Alex Stewart MD  ([id number])  **Electronic Signature**    Total Protein 7 5 6 4 - 8 2 g/dL   IgG, IgA, IgM   Result Value Ref Range     0 70 0 - 400 0 mg/dL    IGG 1,460 0 700 0 - 1,600 0 mg/dL    IGM 48 0 40 0 - 230 0 mg/dL         Imaging Studies: I have personally reviewed pertinent reports  Pathology, and Other Studies: I have personally reviewed pertinent reports  Assessment and Plan:  Patient is here with his wife  Michaelle Huggins is a 56y o  year old male  with the diagnosis of CLL  On fish test he has 17 P deletion  that is and unfavorable feature  He will be checked for Ig VH  He has some tiredness  Intentional weight loss because he is watching his diet  Patient was seen by the physician for left hip pain that was going on off and on for the last 8 months  Activity will aggravate it  Rest will help  Patient had blood tests and MRI scan of left hip  Blood tests, CBC D and CMP favored CLL  MRI scan of left hip showed mild greater trochanteric bursitis  Previous history of hypertension, dyslipidemia and coronary artery disease  He has 1 stent in a small artery of the heart  He was controlling his blood pressure and dyslipidemia with diet but now is back on atenolol and he plans to take baby aspirin and if he takes Advil for his left hip that will not be within 4 hour of aspirin  Recent episode of throat infection that got better without antibiotics     Physical examination and test results are as recorded and discussed  Explained 17 P deletion and on favorable feature  His disease will be monitored closely for that reason  Stage remains 0    Treatment is not indicated at this time  All discussed in detail  Questions answered  1  Chronic lymphocytic leukemia of B-cell type not having achieved remission (HCC)    - CBC and differential; Standing  - Beta 2 microglobuline, serum; Standing  - CBC and differential; Standing  - Comprehensive metabolic panel; Standing  - LD,Blood; Standing  - Uric acid; Standing  - MISCELLANEOUS LAB TEST  - CBC and differential  - Beta 2 microglobuline, serum  - CBC and differential  - Comprehensive metabolic panel  - LD,Blood  - Uric acid    Patient voiced understanding and agreement in the discussion  Counseling / Coordination of Care    Greater than 50% of total time was spent with the patient and / or family counseling and / or coordination of care

## 2018-05-02 NOTE — LETTER
May 2, 2018     [address]  [address] [facility] [address]  [place]  [address]    Patient: Dakota Crenshaw   YOB: 1960   Date of Visit: 5/2/2018       Dear Dr Deena Celaya: Thank you for referring Selina Clay to me for evaluation  Below are my notes for this consultation  If you have questions, please do not hesitate to call me  I look forward to following your patient along with you  Sincerely,        Nisha Augustin MD        CC: No Recipients  Nisha Augustin MD  5/2/2018  9:46 AM  Sign at close encounter    Follow-up visit  Dakota Crenshaw 56 y o  male MRN: [id number]  Unit/Bed#:  Encounter: [id number]        HPI:   Patient is here with his wife  Dakota Crenshaw is a 56y o  year old male  with the diagnosis of CLL  On fish test he has 17 P deletion  that is and unfavorable feature  He will be checked for Ig VH  He has some tiredness  Intentional weight loss because he is watching his diet  Patient was seen by the physician for left hip pain that was going on off and on for the last 8 months  Activity will aggravate it  Rest will help  Patient had blood tests and MRI scan of left hip  Blood tests, CBC D and CMP favored CLL  MRI scan of left hip showed mild greater trochanteric bursitis  Previous history of hypertension, dyslipidemia and coronary artery disease  He has 1 stent in a small artery of the heart  He was controlling his blood pressure and dyslipidemia with diet but now is back on atenolol and he plans to take baby aspirin and if he takes Advil for his left hip that will not be within 4 hour of aspirin  Recent episode of throat infection that got better without antibiotics       ROS:  05/02/18 Reviewed 13 systems:  Presently no headaches, seizures, dizziness, diplopia, dysphagia, hoarseness, chest pain, palpitations, shortness of breath, cough, hemoptysis, abdominal pain, nausea, vomiting, change in bowel habits, melena, hematuria, fever, chills, bleeding, bone pains, skin rash,  weakness, numbness, claudication and gait problem  No frequent infections  Not unusually sensitive to heat or cold  No swelling of the ankles  No swollen glands  Patient is anxious  Other symptoms are in HPI        Historical Information   Past Medical History:   Diagnosis Date    Abnormal coronary angiogram     Leukocytosis      History reviewed  No pertinent surgical history  Social History   History   Alcohol Use No     History   Drug Use No     History   Smoking Status    Never Smoker   Smokeless Tobacco    Never Used     Family History:   Family History   Problem Relation Age of Onset   Anoop Mena Breast cancer Mother     Heart disease Mother     No Known Problems Father     Liver disease Brother          Current Outpatient Prescriptions:     aspirin (ECOTRIN LOW STRENGTH) 81 mg EC tablet, Take 81 mg by mouth daily, Disp: , Rfl:     atenolol (TENORMIN) 25 mg tablet, Take 25 mg by mouth daily, Disp: , Rfl:     cholecalciferol (VITAMIN D3) 1,000 units tablet, Take 1,000 Units by mouth daily, Disp: , Rfl:     No Known Allergies  @ ROS@  Physical Exam:  Vitals:    05/02/18 0800   BP: 142/74   BP Location: Left arm   Cuff Size: Adult   Pulse: 59   Resp: 15   Temp: 97 7 °F (36 5 °C)   TempSrc: Tympanic   SpO2: 99%   Weight: 71 4 kg (157 lb 6 4 oz)   Height: 5' 7" (1 702 m)     Alert, oriented, not in distress, no icterus, no oral thrush, no palpable neck mass, clear lung fields, regular heart rate, abdomen  soft and non tender, no palpable abdominal mass, no ascites, no edema of ankles, no calf tenderness, no focal neurological deficit, no skin rash, no palpable lymphadenopathy, good arterial pulses, no clubbing  Patient is anxious  Performance status 0  Lab Results: I have reviewed all pertinent labs    LABS:  Results for orders placed or performed in visit on 04/05/18   Beta 2 microglobuline, serum   Result Value Ref Range    Beta-2 Microglobulin 2 0 0 6 - 2 4 mg/L LD,Blood   Result Value Ref Range     81 - 234 U/L   Uric acid   Result Value Ref Range    Uric Acid 7 2 4 2 - 8 0 mg/dL   Reticulocytes   Result Value Ref Range    Retic Ct Abs 52,700 14,356 - 105,094    Retic Ct Pct 1 02 0 37 - 1 87 %   Protein electrophoresis, serum   Result Value Ref Range    A/G Ratio 1 62 1 10 - 1 80    Albumin Electrophoresis 61 9 52 0 - 65 0 %    Albumin CONC 4 64 3 50 - 5 00 g/dl    Alpha 1 2 7 2 5 - 5 0 %    ALPHA 1 CONC 0 20 0 10 - 0 40 g/dL    Alpha 2 7 4 7 0 - 13 0 %    ALPHA 2 CONC 0 56 0 40 - 1 20 g/dL    Beta-1 6 0 5 0 - 13 0 %    BETA 1 CONC 0 45 0 40 - 0 80 g/dL    Beta-2 5 7 2 0 - 8 0 %    BETA 2 CONC 0 43 0 20 - 0 50 g/dL    Gamma Globulin 16 3 12 0 - 22 0 %    GAMMA CONC 1 22 0 50 - 1 60 g/dL    SPEP Interpretation       The serum total protein and albumin are within normal limits  No monoclonal bands noted  Reviewed by: Simran Donis MD  ([id number])  **Electronic Signature**    Total Protein 7 5 6 4 - 8 2 g/dL   IgG, IgA, IgM   Result Value Ref Range     0 70 0 - 400 0 mg/dL    IGG 1,460 0 700 0 - 1,600 0 mg/dL    IGM 48 0 40 0 - 230 0 mg/dL         Imaging Studies: I have personally reviewed pertinent reports  Pathology, and Other Studies: I have personally reviewed pertinent reports  Assessment and Plan:  Patient is here with his wife  Farhan Elliott is a 56y o  year old male  with the diagnosis of CLL  On fish test he has 17 P deletion  that is and unfavorable feature  He will be checked for Ig VH  He has some tiredness  Intentional weight loss because he is watching his diet  Patient was seen by the physician for left hip pain that was going on off and on for the last 8 months  Activity will aggravate it  Rest will help  Patient had blood tests and MRI scan of left hip  Blood tests, CBC D and CMP favored CLL  MRI scan of left hip showed mild greater trochanteric bursitis    Previous history of hypertension, dyslipidemia and coronary artery disease  He has 1 stent in a small artery of the heart  He was controlling his blood pressure and dyslipidemia with diet but now is back on atenolol and he plans to take baby aspirin and if he takes Advil for his left hip that will not be within 4 hour of aspirin  Recent episode of throat infection that got better without antibiotics     Physical examination and test results are as recorded and discussed  Explained 17 P deletion and on favorable feature  His disease will be monitored closely for that reason  Stage remains 0  Treatment is not indicated at this time  All discussed in detail  Questions answered  1  Chronic lymphocytic leukemia of B-cell type not having achieved remission (HCC)    - CBC and differential; Standing  - Beta 2 microglobuline, serum; Standing  - CBC and differential; Standing  - Comprehensive metabolic panel; Standing  - LD,Blood; Standing  - Uric acid; Standing  - MISCELLANEOUS LAB TEST  - CBC and differential  - Beta 2 microglobuline, serum  - CBC and differential  - Comprehensive metabolic panel  - LD,Blood  - Uric acid    Patient voiced understanding and agreement in the discussion  Counseling / Coordination of Care    Greater than 50% of total time was spent with the patient and / or family counseling and / or coordination of care

## 2018-05-08 ENCOUNTER — LAB (OUTPATIENT)
Dept: LAB | Facility: MEDICAL CENTER | Age: 58
End: 2018-05-08
Payer: COMMERCIAL

## 2018-05-08 LAB
ALBUMIN SERPL BCP-MCNC: 3.9 G/DL (ref 3.5–5)
ALP SERPL-CCNC: 76 U/L (ref 46–116)
ALT SERPL W P-5'-P-CCNC: 24 U/L (ref 12–78)
ANION GAP SERPL CALCULATED.3IONS-SCNC: 3 MMOL/L (ref 4–13)
AST SERPL W P-5'-P-CCNC: 15 U/L (ref 5–45)
BILIRUB SERPL-MCNC: 0.88 MG/DL (ref 0.2–1)
BUN SERPL-MCNC: 10 MG/DL (ref 5–25)
CALCIUM SERPL-MCNC: 9.1 MG/DL (ref 8.3–10.1)
CHLORIDE SERPL-SCNC: 105 MMOL/L (ref 100–108)
CO2 SERPL-SCNC: 30 MMOL/L (ref 21–32)
CREAT SERPL-MCNC: 0.88 MG/DL (ref 0.6–1.3)
GFR SERPL CREATININE-BSD FRML MDRD: 97 ML/MIN/1.73SQ M
GLUCOSE SERPL-MCNC: 103 MG/DL (ref 65–140)
LDH SERPL-CCNC: 112 U/L (ref 81–234)
POTASSIUM SERPL-SCNC: 4.6 MMOL/L (ref 3.5–5.3)
PROT SERPL-MCNC: 7.1 G/DL (ref 6.4–8.2)
SODIUM SERPL-SCNC: 138 MMOL/L (ref 136–145)
URATE SERPL-MCNC: 7.3 MG/DL (ref 4.2–8)

## 2018-05-08 PROCEDURE — 85027 COMPLETE CBC AUTOMATED: CPT | Performed by: INTERNAL MEDICINE

## 2018-05-08 PROCEDURE — 82232 ASSAY OF BETA-2 PROTEIN: CPT | Performed by: INTERNAL MEDICINE

## 2018-05-08 PROCEDURE — 81263 IGH VARI REGIONAL MUTATION: CPT | Performed by: INTERNAL MEDICINE

## 2018-05-08 PROCEDURE — 84550 ASSAY OF BLOOD/URIC ACID: CPT | Performed by: INTERNAL MEDICINE

## 2018-05-08 PROCEDURE — 83615 LACTATE (LD) (LDH) ENZYME: CPT | Performed by: INTERNAL MEDICINE

## 2018-05-08 PROCEDURE — 36415 COLL VENOUS BLD VENIPUNCTURE: CPT | Performed by: INTERNAL MEDICINE

## 2018-05-08 PROCEDURE — 85007 BL SMEAR W/DIFF WBC COUNT: CPT | Performed by: INTERNAL MEDICINE

## 2018-05-08 PROCEDURE — 80053 COMPREHEN METABOLIC PANEL: CPT | Performed by: INTERNAL MEDICINE

## 2018-05-08 PROCEDURE — 85025 COMPLETE CBC W/AUTO DIFF WBC: CPT | Performed by: INTERNAL MEDICINE

## 2018-05-09 ENCOUNTER — TELEPHONE (OUTPATIENT)
Dept: HEMATOLOGY ONCOLOGY | Facility: CLINIC | Age: 58
End: 2018-05-09

## 2018-05-09 LAB
B2 MICROGLOB SERPL-MCNC: 1.6 MG/L (ref 0.6–2.4)
BASOPHILS # BLD MANUAL: 0 THOUSAND/UL (ref 0–0.1)
BASOPHILS NFR MAR MANUAL: 0 % (ref 0–1)
BLASTS NFR BLD MANUAL: 5 %
EOSINOPHIL # BLD MANUAL: 0.35 THOUSAND/UL (ref 0–0.4)
EOSINOPHIL NFR BLD MANUAL: 1 % (ref 0–6)
ERYTHROCYTE [DISTWIDTH] IN BLOOD BY AUTOMATED COUNT: 13.2 % (ref 11.6–15.1)
HCT VFR BLD AUTO: 43.5 % (ref 36.5–49.3)
HGB BLD-MCNC: 14.6 G/DL (ref 12–17)
LYMPHOCYTES # BLD AUTO: 23.78 THOUSAND/UL (ref 0.6–4.47)
LYMPHOCYTES # BLD AUTO: 67 % (ref 14–44)
MCH RBC QN AUTO: 29.3 PG (ref 26.8–34.3)
MCHC RBC AUTO-ENTMCNC: 33.6 G/DL (ref 31.4–37.4)
MCV RBC AUTO: 87 FL (ref 82–98)
MONOCYTES # BLD AUTO: 0 THOUSAND/UL (ref 0–1.22)
MONOCYTES NFR BLD: 0 % (ref 4–12)
NEUTROPHILS # BLD MANUAL: 3.9 THOUSAND/UL (ref 1.85–7.62)
NEUTS SEG NFR BLD AUTO: 11 % (ref 43–75)
NRBC BLD AUTO-RTO: 0 /100 WBCS
PATHOLOGIST INTERPRETATION: NORMAL
PATHOLOGY REVIEW: YES
PLATELET # BLD AUTO: 191 THOUSANDS/UL (ref 149–390)
PLATELET BLD QL SMEAR: ADEQUATE
PMV BLD AUTO: 11.4 FL (ref 8.9–12.7)
RBC # BLD AUTO: 4.99 MILLION/UL (ref 3.88–5.62)
RBC MORPH BLD: NORMAL
TOTAL CELLS COUNTED SPEC: 100
VARIANT LYMPHS # BLD AUTO: 16 %
WBC # BLD AUTO: 35.49 THOUSAND/UL (ref 4.31–10.16)

## 2018-05-10 ENCOUNTER — TELEPHONE (OUTPATIENT)
Dept: HEMATOLOGY ONCOLOGY | Facility: CLINIC | Age: 58
End: 2018-05-10

## 2018-05-10 DIAGNOSIS — C91.10 CLL (CHRONIC LYMPHOCYTIC LEUKEMIA) (HCC): Primary | ICD-10-CM

## 2018-05-16 RX ORDER — SODIUM CHLORIDE 9 MG/ML
75 INJECTION, SOLUTION INTRAVENOUS CONTINUOUS
Status: CANCELLED | OUTPATIENT
Start: 2018-05-18

## 2018-05-18 ENCOUNTER — HOSPITAL ENCOUNTER (OUTPATIENT)
Dept: RADIOLOGY | Facility: HOSPITAL | Age: 58
Discharge: HOME | End: 2018-05-18
Attending: INTERNAL MEDICINE

## 2018-05-21 ENCOUNTER — TELEPHONE (OUTPATIENT)
Dept: HEMATOLOGY ONCOLOGY | Facility: CLINIC | Age: 58
End: 2018-05-21

## 2018-05-21 LAB — MISCELLANEOUS LAB TEST RESULT: NORMAL

## 2018-05-21 NOTE — TELEPHONE ENCOUNTER
Called patient to discuss his cancelling of his 5/18/18 bone marrow biopsy  Patient states he was sick the day of his biopsy and is agreeable to being rescheduled for the bone marrow biopsy in the future  Will have a  call to arrange

## 2018-06-01 RX ORDER — SODIUM CHLORIDE 9 MG/ML
75 INJECTION, SOLUTION INTRAVENOUS CONTINUOUS
Status: CANCELLED | OUTPATIENT
Start: 2018-06-01

## 2018-06-04 ENCOUNTER — TRANSCRIBE ORDERS (OUTPATIENT)
Dept: ADMINISTRATIVE | Facility: HOSPITAL | Age: 58
End: 2018-06-04

## 2018-06-04 DIAGNOSIS — C91.90 LYMPHOID LEUKEMIA NOT HAVING ACHIEVED REMISSION, UNSPECIFIED LYMPHOID LEUKEMIA TYPE (HCC): Primary | ICD-10-CM

## 2018-06-06 ENCOUNTER — HOSPITAL ENCOUNTER (OUTPATIENT)
Dept: RADIOLOGY | Facility: HOSPITAL | Age: 58
Discharge: HOME | End: 2018-06-06
Attending: INTERNAL MEDICINE

## 2018-06-13 ENCOUNTER — APPOINTMENT (OUTPATIENT)
Dept: LAB | Facility: MEDICAL CENTER | Age: 58
End: 2018-06-13
Payer: COMMERCIAL

## 2018-06-20 ENCOUNTER — HOSPITAL ENCOUNTER (OUTPATIENT)
Dept: CT IMAGING | Facility: HOSPITAL | Age: 58
Discharge: HOME | End: 2018-06-20
Attending: INTERNAL MEDICINE

## 2018-07-11 ENCOUNTER — TRANSCRIBE ORDERS (OUTPATIENT)
Dept: ADMINISTRATIVE | Facility: HOSPITAL | Age: 58
End: 2018-07-11

## 2018-07-11 ENCOUNTER — APPOINTMENT (OUTPATIENT)
Dept: LAB | Facility: MEDICAL CENTER | Age: 58
End: 2018-07-11
Payer: COMMERCIAL

## 2018-08-06 ENCOUNTER — TRANSCRIBE ORDERS (OUTPATIENT)
Dept: ADMINISTRATIVE | Facility: HOSPITAL | Age: 58
End: 2018-08-06

## 2018-08-06 ENCOUNTER — APPOINTMENT (OUTPATIENT)
Dept: LAB | Facility: MEDICAL CENTER | Age: 58
End: 2018-08-06
Payer: COMMERCIAL

## 2018-08-06 DIAGNOSIS — D63.0 ANEMIA ASSOCIATED WITH CHRONIC LYMPHOCYTIC LEUKEMIA TREATED WITH ERYTHROPOIETIN (HCC): Primary | ICD-10-CM

## 2018-08-06 DIAGNOSIS — D63.0 ANEMIA ASSOCIATED WITH CHRONIC LYMPHOCYTIC LEUKEMIA TREATED WITH ERYTHROPOIETIN (HCC): ICD-10-CM

## 2018-08-06 DIAGNOSIS — C91.10 ANEMIA ASSOCIATED WITH CHRONIC LYMPHOCYTIC LEUKEMIA TREATED WITH ERYTHROPOIETIN (HCC): ICD-10-CM

## 2018-08-06 DIAGNOSIS — C91.10 ANEMIA ASSOCIATED WITH CHRONIC LYMPHOCYTIC LEUKEMIA TREATED WITH ERYTHROPOIETIN (HCC): Primary | ICD-10-CM

## 2018-08-06 LAB
ALBUMIN SERPL BCP-MCNC: 3.8 G/DL (ref 3.5–5)
ALP SERPL-CCNC: 62 U/L (ref 46–116)
ALT SERPL W P-5'-P-CCNC: 20 U/L (ref 12–78)
ANION GAP SERPL CALCULATED.3IONS-SCNC: 4 MMOL/L (ref 4–13)
AST SERPL W P-5'-P-CCNC: 17 U/L (ref 5–45)
BASOPHILS # BLD MANUAL: 0.23 THOUSAND/UL (ref 0–0.1)
BASOPHILS NFR MAR MANUAL: 1 % (ref 0–1)
BILIRUB SERPL-MCNC: 1.23 MG/DL (ref 0.2–1)
BUN SERPL-MCNC: 8 MG/DL (ref 5–25)
CALCIUM SERPL-MCNC: 8.6 MG/DL (ref 8.3–10.1)
CHLORIDE SERPL-SCNC: 110 MMOL/L (ref 100–108)
CO2 SERPL-SCNC: 28 MMOL/L (ref 21–32)
CREAT SERPL-MCNC: 0.91 MG/DL (ref 0.6–1.3)
EOSINOPHIL # BLD MANUAL: 0.23 THOUSAND/UL (ref 0–0.4)
EOSINOPHIL NFR BLD MANUAL: 1 % (ref 0–6)
ERYTHROCYTE [DISTWIDTH] IN BLOOD BY AUTOMATED COUNT: 12.5 % (ref 11.6–15.1)
GFR SERPL CREATININE-BSD FRML MDRD: 94 ML/MIN/1.73SQ M
GLUCOSE P FAST SERPL-MCNC: 88 MG/DL (ref 65–99)
HCT VFR BLD AUTO: 41.6 % (ref 36.5–49.3)
HGB BLD-MCNC: 13.4 G/DL (ref 12–17)
LDH SERPL-CCNC: 103 U/L (ref 81–234)
LYMPHOCYTES # BLD AUTO: 18.31 THOUSAND/UL (ref 0.6–4.47)
LYMPHOCYTES # BLD AUTO: 81 % (ref 14–44)
MCH RBC QN AUTO: 29.1 PG (ref 26.8–34.3)
MCHC RBC AUTO-ENTMCNC: 32.2 G/DL (ref 31.4–37.4)
MCV RBC AUTO: 90 FL (ref 82–98)
MONOCYTES # BLD AUTO: 1.13 THOUSAND/UL (ref 0–1.22)
MONOCYTES NFR BLD: 5 % (ref 4–12)
NEUTROPHILS # BLD MANUAL: 2.71 THOUSAND/UL (ref 1.85–7.62)
NEUTS SEG NFR BLD AUTO: 12 % (ref 43–75)
NRBC BLD AUTO-RTO: 0 /100 WBCS
PLATELET # BLD AUTO: 172 THOUSANDS/UL (ref 149–390)
PLATELET BLD QL SMEAR: ADEQUATE
PMV BLD AUTO: 11.8 FL (ref 8.9–12.7)
POTASSIUM SERPL-SCNC: 4.7 MMOL/L (ref 3.5–5.3)
PROT SERPL-MCNC: 7 G/DL (ref 6.4–8.2)
RBC # BLD AUTO: 4.6 MILLION/UL (ref 3.88–5.62)
RBC MORPH BLD: NORMAL
SODIUM SERPL-SCNC: 142 MMOL/L (ref 136–145)
TOTAL CELLS COUNTED SPEC: 100
URATE SERPL-MCNC: 6.9 MG/DL (ref 4.2–8)
WBC # BLD AUTO: 22.6 THOUSAND/UL (ref 4.31–10.16)

## 2018-08-06 PROCEDURE — 36415 COLL VENOUS BLD VENIPUNCTURE: CPT

## 2018-08-06 PROCEDURE — 85007 BL SMEAR W/DIFF WBC COUNT: CPT

## 2018-08-06 PROCEDURE — 83615 LACTATE (LD) (LDH) ENZYME: CPT

## 2018-08-06 PROCEDURE — 86146 BETA-2 GLYCOPROTEIN ANTIBODY: CPT

## 2018-08-06 PROCEDURE — 85027 COMPLETE CBC AUTOMATED: CPT

## 2018-08-06 PROCEDURE — 84550 ASSAY OF BLOOD/URIC ACID: CPT

## 2018-08-06 PROCEDURE — 80053 COMPREHEN METABOLIC PANEL: CPT

## 2018-08-07 LAB
B2 GLYCOPROT1 IGA SER-ACNC: <9 GPI IGA UNITS (ref 0–25)
B2 GLYCOPROT1 IGG SER-ACNC: <9 GPI IGG UNITS (ref 0–20)
B2 GLYCOPROT1 IGM SER-ACNC: <9 GPI IGM UNITS (ref 0–32)

## 2018-08-08 ENCOUNTER — OFFICE VISIT (OUTPATIENT)
Dept: HEMATOLOGY ONCOLOGY | Facility: CLINIC | Age: 58
End: 2018-08-08
Payer: COMMERCIAL

## 2018-08-08 VITALS
SYSTOLIC BLOOD PRESSURE: 132 MMHG | HEART RATE: 74 BPM | RESPIRATION RATE: 16 BRPM | DIASTOLIC BLOOD PRESSURE: 78 MMHG | WEIGHT: 152.4 LBS | OXYGEN SATURATION: 97 % | TEMPERATURE: 98.3 F | BODY MASS INDEX: 23.92 KG/M2 | HEIGHT: 67 IN

## 2018-08-08 DIAGNOSIS — C91.10 CHRONIC LYMPHOCYTIC LEUKEMIA OF B-CELL TYPE NOT HAVING ACHIEVED REMISSION (HCC): Primary | ICD-10-CM

## 2018-08-08 PROCEDURE — 99214 OFFICE O/P EST MOD 30 MIN: CPT | Performed by: INTERNAL MEDICINE

## 2018-08-08 NOTE — LETTER
August 8, 2018     [address]idi [address]  [address] [facility] [address]  [place]  [address]    Patient: Floyd Ocampo   YOB: 1960   Date of Visit: 8/8/2018       Dear Dr Flor Clinton: Thank you for referring Floyd Ocampo to me for evaluation  Below are my notes for this consultation  If you have questions, please do not hesitate to call me  I look forward to following your patient along with you  Sincerely,        Miguel Aguilar MD        CC: No Recipients  Miguel Aguilar MD  8/8/2018  6:16 PM  Sign at close encounter    HPI:   Floyd Ocampo is a 66 y o  male with  Stage 0 CLL with unfavorable features, 17 P deletion and unmutated Ig VH  Patient is under  Surveillance  He is not symptomatic from this condition  He has cut out the eating wheat and could be losing some weight because of that  He was seen in consultation at [facility] and recommendation was surveillance  Patient did not want to go for bone marrow test   History of arthritis in left hip the         - CBC and differential; Standing  - Beta 2 microglobuline, serum; Standing  - CBC and differential; Standing  - Comprehensive metabolic panel; Standing  - LD,Blood; Standing  - Uric acid; Standing  - MISCELLANEOUS LAB TEST  - CBC and differential  - Beta 2 microglobuline, serum  - CBC and differential  - Comprehensive metabolic panel  - LD,Blood  - Uric acid      Current Outpatient Prescriptions:     aspirin (ECOTRIN LOW STRENGTH) 81 mg EC tablet, Take 81 mg by mouth daily, Disp: , Rfl:     atenolol (TENORMIN) 25 mg tablet, Take 25 mg by mouth daily, Disp: , Rfl:     cholecalciferol (VITAMIN D3) 1,000 units tablet, Take 1,000 Units by mouth daily, Disp: , Rfl:     No Known Allergies     No history exists         ROS:  08/08/18 Reviewed 13 systems:  Presently no headaches, seizures, dizziness, diplopia, dysphagia, hoarseness, chest pain, palpitations, shortness of breath, cough, hemoptysis, abdominal pain, nausea, vomiting, change in bowel habits, melena, hematuria, fever, chills, bleeding, bone pains, skin rash,   tiredness ,  weakness, numbness,  claudication and gait problem  No frequent infections  Not unusually sensitive to heat or cold  No swelling of the ankles  No swollen glands  Patient is anxious  Other symptoms are in HPI        /78 (BP Location: Left arm, Cuff Size: Adult)   Pulse 74   Temp 98 3 °F (36 8 °C) (Tympanic)   Resp 16   Ht 5' 7" (1 702 m)   Wt 69 1 kg (152 lb 6 4 oz)   SpO2 97%   BMI 23 87 kg/m²      Physical Exam:  Alert, oriented, not in distress, no icterus, no oral thrush, no palpable neck mass, clear lung fields, regular heart rate, abdomen  soft and non tender, no palpable abdominal mass, no ascites, no edema of ankles, no calf tenderness, no focal neurological deficit, no skin rash, no palpable lymphadenopathy, good arterial pulses, no clubbing  Patient is anxious  Performance status 0      IMAGING:      LABS:  Results for orders placed or performed in visit on 08/06/18   CBC and differential   Result Value Ref Range    WBC 22 60 (H) 4 31 - 10 16 Thousand/uL    RBC 4 60 3 88 - 5 62 Million/uL    Hemoglobin 13 4 12 0 - 17 0 g/dL    Hematocrit 41 6 36 5 - 49 3 %    MCV 90 82 - 98 fL    MCH 29 1 26 8 - 34 3 pg    MCHC 32 2 31 4 - 37 4 g/dL    RDW 12 5 11 6 - 15 1 %    MPV 11 8 8 9 - 12 7 fL    Platelets 693 526 - 482 Thousands/uL    nRBC 0 /100 WBCs   Beta-2 glycoprotein antibodies   Result Value Ref Range    Beta-2 Glyco 1 IgG <9 0 - 20 GPI IgG units    Beta-2 Glyco 1 IgA <9 0 - 25 GPI IgA units    Beta-2 Glyco 1 IgM <9 0 - 32 GPI IgM units   Comprehensive metabolic panel   Result Value Ref Range    Sodium 142 136 - 145 mmol/L    Potassium 4 7 3 5 - 5 3 mmol/L    Chloride 110 (H) 100 - 108 mmol/L    CO2 28 21 - 32 mmol/L    Anion Gap 4 4 - 13 mmol/L    BUN 8 5 - 25 mg/dL    Creatinine 0 91 0 60 - 1 30 mg/dL    Glucose, Fasting 88 65 - 99 mg/dL    Calcium 8 6 8 3 - 10 1 mg/dL AST 17 5 - 45 U/L    ALT 20 12 - 78 U/L    Alkaline Phosphatase 62 46 - 116 U/L    Total Protein 7 0 6 4 - 8 2 g/dL    Albumin 3 8 3 5 - 5 0 g/dL    Total Bilirubin 1 23 (H) 0 20 - 1 00 mg/dL    eGFR 94 ml/min/1 73sq m   LD,Blood   Result Value Ref Range     81 - 234 U/L   Uric acid   Result Value Ref Range    Uric Acid 6 9 4 2 - 8 0 mg/dL   Manual Differential(PHLEBS Do Not Order)   Result Value Ref Range    Segmented % 12 (L) 43 - 75 %    Lymphocytes % 81 (H) 14 - 44 %    Monocytes % 5 4 - 12 %    Eosinophils % 1 0 - 6 %    Basophils % 1 0 - 1 %    Absolute Neutrophils 2 71 1 85 - 7 62 Thousand/uL    Lymphocytes Absolute 18 31 (H) 0 60 - 4 47 Thousand/uL    Monocytes Absolute 1 13 0 00 - 1 22 Thousand/uL    Eosinophils Absolute 0 23 0 00 - 0 40 Thousand/uL    Basophils Absolute 0 23 (H) 0 00 - 0 10 Thousand/uL    Total Counted 100     RBC Morphology Normal     Platelet Estimate Adequate Adequate     Labs, Imaging, & Other studies:   All pertinent labs and imaging studies were personally reviewed    Lab Results   Component Value Date     08/06/2018    K 4 7 08/06/2018     (H) 08/06/2018    CO2 28 08/06/2018    ANIONGAP 4 08/06/2018    BUN 8 08/06/2018    CREATININE 0 91 08/06/2018    GLUCOSE 118 07/11/2018    GLUF 88 08/06/2018    CALCIUM 8 6 08/06/2018    AST 17 08/06/2018    ALT 20 08/06/2018    ALKPHOS 62 08/06/2018    PROT 7 0 08/06/2018    BILITOT 1 23 (H) 08/06/2018    EGFR 94 08/06/2018     Lab Results   Component Value Date    WBC 22 60 (H) 08/06/2018    HGB 13 4 08/06/2018    HCT 41 6 08/06/2018    MCV 90 08/06/2018     08/06/2018       Reviewed and discussed with patient  Assessment and plan:  Selina Clay is a 66 y o  male with  Stage 0 CLL and on favorable prognostic features, 17 P deletion and unmutated Ig VH  Disease is not behaving aggressively  Hematologically stable  No indication to treat CLL at this time    He would like his blood test checked every month and come back in 3 months  He will call and come sooner for problems related to CLL as discussed and to our speciality  Rediscussed treatment indications  All discussed in detail  Questions answered  1  Chronic lymphocytic leukemia of B-cell type not having achieved remission (HCC)    - Beta 2 microglobulin, serum; Standing  - CBC and differential; Standing  - Comprehensive metabolic panel; Standing  - LD,Blood; Standing  - Uric acid; Standing  - IgG; Standing  - Beta 2 microglobulin, serum  - CBC and differential  - Comprehensive metabolic panel  - LD,Blood  - Uric acid  - IgG        Patient voiced understanding and agreement in the discussion  Counseling / Coordination of Care   Greater than 50% of total time was spent with the patient and / or family counseling and / or coordination of care

## 2018-08-08 NOTE — PROGRESS NOTES
HPI:   Melida Emery is a 66 y o  male with  Stage 0 CLL with unfavorable features, 17 P deletion and unmutated Ig VH  Patient is under  Surveillance  He is not symptomatic from this condition  He has cut out the eating wheat and could be losing some weight because of that  He was seen in consultation at [address] and recommendation was surveillance  Patient did not want to go for bone marrow test   History of arthritis in left hip the         - CBC and differential; Standing  - Beta 2 microglobuline, serum; Standing  - CBC and differential; Standing  - Comprehensive metabolic panel; Standing  - LD,Blood; Standing  - Uric acid; Standing  - MISCELLANEOUS LAB TEST  - CBC and differential  - Beta 2 microglobuline, serum  - CBC and differential  - Comprehensive metabolic panel  - LD,Blood  - Uric acid      Current Outpatient Prescriptions:     aspirin (ECOTRIN LOW STRENGTH) 81 mg EC tablet, Take 81 mg by mouth daily, Disp: , Rfl:     atenolol (TENORMIN) 25 mg tablet, Take 25 mg by mouth daily, Disp: , Rfl:     cholecalciferol (VITAMIN D3) 1,000 units tablet, Take 1,000 Units by mouth daily, Disp: , Rfl:     No Known Allergies     No history exists  ROS:  08/08/18 Reviewed 13 systems:  Presently no headaches, seizures, dizziness, diplopia, dysphagia, hoarseness, chest pain, palpitations, shortness of breath, cough, hemoptysis, abdominal pain, nausea, vomiting, change in bowel habits, melena, hematuria, fever, chills, bleeding, bone pains, skin rash,   tiredness ,  weakness, numbness,  claudication and gait problem  No frequent infections  Not unusually sensitive to heat or cold  No swelling of the ankles  No swollen glands  Patient is anxious   Other symptoms are in HPI        /78 (BP Location: Left arm, Cuff Size: Adult)   Pulse 74   Temp 98 3 °F (36 8 °C) (Tympanic)   Resp 16   Ht 5' 7" (1 702 m)   Wt 69 1 kg (152 lb 6 4 oz)   SpO2 97%   BMI 23 87 kg/m²     Physical Exam:  Alert, oriented, not in distress, no icterus, no oral thrush, no palpable neck mass, clear lung fields, regular heart rate, abdomen  soft and non tender, no palpable abdominal mass, no ascites, no edema of ankles, no calf tenderness, no focal neurological deficit, no skin rash, no palpable lymphadenopathy, good arterial pulses, no clubbing  Patient is anxious  Performance status 0      IMAGING:      LABS:  Results for orders placed or performed in visit on 08/06/18   CBC and differential   Result Value Ref Range    WBC 22 60 (H) 4 31 - 10 16 Thousand/uL    RBC 4 60 3 88 - 5 62 Million/uL    Hemoglobin 13 4 12 0 - 17 0 g/dL    Hematocrit 41 6 36 5 - 49 3 %    MCV 90 82 - 98 fL    MCH 29 1 26 8 - 34 3 pg    MCHC 32 2 31 4 - 37 4 g/dL    RDW 12 5 11 6 - 15 1 %    MPV 11 8 8 9 - 12 7 fL    Platelets 466 061 - 510 Thousands/uL    nRBC 0 /100 WBCs   Beta-2 glycoprotein antibodies   Result Value Ref Range    Beta-2 Glyco 1 IgG <9 0 - 20 GPI IgG units    Beta-2 Glyco 1 IgA <9 0 - 25 GPI IgA units    Beta-2 Glyco 1 IgM <9 0 - 32 GPI IgM units   Comprehensive metabolic panel   Result Value Ref Range    Sodium 142 136 - 145 mmol/L    Potassium 4 7 3 5 - 5 3 mmol/L    Chloride 110 (H) 100 - 108 mmol/L    CO2 28 21 - 32 mmol/L    Anion Gap 4 4 - 13 mmol/L    BUN 8 5 - 25 mg/dL    Creatinine 0 91 0 60 - 1 30 mg/dL    Glucose, Fasting 88 65 - 99 mg/dL    Calcium 8 6 8 3 - 10 1 mg/dL    AST 17 5 - 45 U/L    ALT 20 12 - 78 U/L    Alkaline Phosphatase 62 46 - 116 U/L    Total Protein 7 0 6 4 - 8 2 g/dL    Albumin 3 8 3 5 - 5 0 g/dL    Total Bilirubin 1 23 (H) 0 20 - 1 00 mg/dL    eGFR 94 ml/min/1 73sq m   LD,Blood   Result Value Ref Range     81 - 234 U/L   Uric acid   Result Value Ref Range    Uric Acid 6 9 4 2 - 8 0 mg/dL   Manual Differential(PHLEBS Do Not Order)   Result Value Ref Range    Segmented % 12 (L) 43 - 75 %    Lymphocytes % 81 (H) 14 - 44 %    Monocytes % 5 4 - 12 %    Eosinophils % 1 0 - 6 % Basophils % 1 0 - 1 %    Absolute Neutrophils 2 71 1 85 - 7 62 Thousand/uL    Lymphocytes Absolute 18 31 (H) 0 60 - 4 47 Thousand/uL    Monocytes Absolute 1 13 0 00 - 1 22 Thousand/uL    Eosinophils Absolute 0 23 0 00 - 0 40 Thousand/uL    Basophils Absolute 0 23 (H) 0 00 - 0 10 Thousand/uL    Total Counted 100     RBC Morphology Normal     Platelet Estimate Adequate Adequate     Labs, Imaging, & Other studies:   All pertinent labs and imaging studies were personally reviewed    Lab Results   Component Value Date     08/06/2018    K 4 7 08/06/2018     (H) 08/06/2018    CO2 28 08/06/2018    ANIONGAP 4 08/06/2018    BUN 8 08/06/2018    CREATININE 0 91 08/06/2018    GLUCOSE 118 07/11/2018    GLUF 88 08/06/2018    CALCIUM 8 6 08/06/2018    AST 17 08/06/2018    ALT 20 08/06/2018    ALKPHOS 62 08/06/2018    PROT 7 0 08/06/2018    BILITOT 1 23 (H) 08/06/2018    EGFR 94 08/06/2018     Lab Results   Component Value Date    WBC 22 60 (H) 08/06/2018    HGB 13 4 08/06/2018    HCT 41 6 08/06/2018    MCV 90 08/06/2018     08/06/2018       Reviewed and discussed with patient  Assessment and plan:  Berna Hernández is a 66 y o  male with  Stage 0 CLL and on favorable prognostic features, 17 P deletion and unmutated Ig VH  Disease is not behaving aggressively  Hematologically stable  No indication to treat CLL at this time  He would like his blood test checked every month and come back in 3 months  He will call and come sooner for problems related to CLL as discussed and to our speciality  Rediscussed treatment indications  All discussed in detail  Questions answered      1  Chronic lymphocytic leukemia of B-cell type not having achieved remission (HCC)    - Beta 2 microglobulin, serum; Standing  - CBC and differential; Standing  - Comprehensive metabolic panel; Standing  - LD,Blood; Standing  - Uric acid; Standing  - IgG; Standing  - Beta 2 microglobulin, serum  - CBC and differential  - Comprehensive metabolic panel  - LD,Blood  - Uric acid  - IgG        Patient voiced understanding and agreement in the discussion  Counseling / Coordination of Care   Greater than 50% of total time was spent with the patient and / or family counseling and / or coordination of care

## 2018-09-17 LAB
25(OH)D3 SERPL-MCNC: 108 NG/ML (ref 30–100)
APPEARANCE UR: CLEAR
BILIRUB UR QL STRIP: NEGATIVE
CHOLEST SERPL-MCNC: 189 MG/DL
CHOLEST/HDLC SERPL: 4.7 (CALC)
COLOR UR: YELLOW
GLUCOSE UR QL STRIP: NEGATIVE
HBA1C MFR BLD: 4.9 % OF TOTAL HGB
HDLC SERPL-MCNC: 40 MG/DL
HGB UR QL STRIP: NEGATIVE
KETONES UR QL STRIP: NEGATIVE
LDLC SERPL CALC-MCNC: 128 MG/DL (CALC)
LEUKOCYTE ESTERASE UR QL STRIP: NEGATIVE
NITRITE UR QL STRIP: NEGATIVE
NONHDLC SERPL-MCNC: 149 MG/DL (CALC)
PH UR STRIP: 7 [PH] (ref 5–8)
PROT UR QL STRIP: NEGATIVE
SP GR UR STRIP: 1 (ref 1–1.03)
TRIGL SERPL-MCNC: 106 MG/DL
VIT B12 SERPL-MCNC: 762 PG/ML (ref 200–1100)

## 2018-09-20 LAB
ALBUMIN SERPL-MCNC: 4.6 G/DL (ref 3.6–5.1)
ALBUMIN/GLOB SERPL: 1.6 (CALC) (ref 1–2.5)
ALP SERPL-CCNC: 75 U/L (ref 40–115)
ALT SERPL-CCNC: 14 U/L (ref 9–46)
AST SERPL-CCNC: 17 U/L (ref 10–35)
B2 MICROGLOB SERPL-MCNC: 2.1 MG/L
BASOPHILS # BLD AUTO: 106 CELLS/UL (ref 0–200)
BASOPHILS NFR BLD AUTO: 0.4 %
BILIRUB SERPL-MCNC: 1.6 MG/DL (ref 0.2–1.2)
BUN SERPL-MCNC: 8 MG/DL (ref 7–25)
BUN/CREAT SERPL: ABNORMAL (CALC) (ref 6–22)
CALCIUM SERPL-MCNC: 9.5 MG/DL (ref 8.6–10.3)
CHLORIDE SERPL-SCNC: 108 MMOL/L (ref 98–110)
CO2 SERPL-SCNC: 26 MMOL/L (ref 20–32)
CREAT SERPL-MCNC: 0.82 MG/DL (ref 0.7–1.33)
EOSINOPHIL # BLD AUTO: 80 CELLS/UL (ref 15–500)
EOSINOPHIL NFR BLD AUTO: 0.3 %
ERYTHROCYTE [DISTWIDTH] IN BLOOD BY AUTOMATED COUNT: 12.5 % (ref 11–15)
GLOBULIN SER CALC-MCNC: 2.8 G/DL (CALC) (ref 1.9–3.7)
GLUCOSE SERPL-MCNC: 100 MG/DL (ref 65–139)
HCT VFR BLD AUTO: 44.6 % (ref 38.5–50)
HGB BLD-MCNC: 15.1 G/DL (ref 13.2–17.1)
IGG SERPL-MCNC: 1399 MG/DL (ref 694–1618)
LDH SERPL-CCNC: 104 U/L (ref 120–250)
LDH1 CFR SERPL ELPH: 30 % (ref 19–38)
LDH2 CFR SERPL ELPH: 35 % (ref 30–43)
LDH3 CFR SERPL ELPH: 21 % (ref 16–26)
LDH4 CFR SERPL ELPH: 6 % (ref 3–12)
LDH5 CFR SERPL ELPH: 8 % (ref 3–14)
LYMPHOCYTES # BLD AUTO: ABNORMAL CELLS/UL (ref 850–3900)
LYMPHOCYTES NFR BLD AUTO: 84.6 %
MCH RBC QN AUTO: 30.4 PG (ref 27–33)
MCHC RBC AUTO-ENTMCNC: 33.9 G/DL (ref 32–36)
MCV RBC AUTO: 89.7 FL (ref 80–100)
MONOCYTES # BLD AUTO: 610 CELLS/UL (ref 200–950)
MONOCYTES NFR BLD AUTO: 2.3 %
NEUTROPHILS # BLD AUTO: 3286 CELLS/UL (ref 1500–7800)
NEUTROPHILS NFR BLD AUTO: 12.4 %
PLATELET # BLD AUTO: 173 THOUSAND/UL (ref 140–400)
PMV BLD REES-ECKER: 11.7 FL (ref 7.5–12.5)
POTASSIUM SERPL-SCNC: 4.1 MMOL/L (ref 3.5–5.3)
PROT SERPL-MCNC: 7.4 G/DL (ref 6.1–8.1)
RBC # BLD AUTO: 4.97 MILLION/UL (ref 4.2–5.8)
SERVICE CMNT-IMP: ABNORMAL
SL AMB EGFR AFRICAN AMERICAN: 115 ML/MIN/1.73M2
SL AMB EGFR NON AFRICAN AMERICAN: 99 ML/MIN/1.73M2
SODIUM SERPL-SCNC: 139 MMOL/L (ref 135–146)
URATE SERPL-MCNC: 7.4 MG/DL (ref 4–8)
WBC # BLD AUTO: 26.5 THOUSAND/UL (ref 3.8–10.8)

## 2018-09-24 ENCOUNTER — TELEPHONE (OUTPATIENT)
Dept: HEMATOLOGY ONCOLOGY | Facility: CLINIC | Age: 58
End: 2018-09-24

## 2018-09-24 NOTE — TELEPHONE ENCOUNTER
I called patient to let him know that his CT scan done in 2008 showed undescended testicle on the left side behind the bladder and he should see a urologist   He said he saw a urologist 2 years in a row around that time    He knows that this is out of my area of expertise and he should follow-up with the urologist   If he wants I could refer him to a urologist   He said he will let me know when and if

## 2018-10-15 ENCOUNTER — TELEPHONE (OUTPATIENT)
Dept: HEMATOLOGY ONCOLOGY | Facility: CLINIC | Age: 58
End: 2018-10-15

## 2018-10-15 NOTE — TELEPHONE ENCOUNTER
PT CALLED TO SEE IF HE CAN GET A STANDING ORDER FOR BLOOD WORK WHICH IS REPEATED EVERY MONTH  PLEASE MAIL NEW ORDER TO PT  THANK YOU

## 2018-10-15 NOTE — TELEPHONE ENCOUNTER
Printed standing orders that were in the system and mailed them out to him  Called him and informed him of this information

## 2018-10-19 DIAGNOSIS — C91.10 CLL (CHRONIC LYMPHOCYTIC LEUKEMIA) (HCC): Primary | ICD-10-CM

## 2018-10-23 LAB
ALBUMIN SERPL-MCNC: 4.3 G/DL (ref 3.6–5.1)
ALBUMIN/GLOB SERPL: 1.7 (CALC) (ref 1–2.5)
ALP SERPL-CCNC: 69 U/L (ref 40–115)
ALT SERPL-CCNC: 14 U/L (ref 9–46)
AST SERPL-CCNC: 17 U/L (ref 10–35)
B2 MICROGLOB SERPL-MCNC: 2.03 MG/L
BASOPHILS # BLD AUTO: 115 CELLS/UL (ref 0–200)
BASOPHILS NFR BLD AUTO: 0.5 %
BILIRUB SERPL-MCNC: 0.9 MG/DL (ref 0.2–1.2)
BUN SERPL-MCNC: 10 MG/DL (ref 7–25)
BUN/CREAT SERPL: NORMAL (CALC) (ref 6–22)
CALCIUM SERPL-MCNC: 9.4 MG/DL (ref 8.6–10.3)
CHLORIDE SERPL-SCNC: 109 MMOL/L (ref 98–110)
CO2 SERPL-SCNC: 27 MMOL/L (ref 20–32)
CREAT SERPL-MCNC: 0.86 MG/DL (ref 0.7–1.33)
EOSINOPHIL # BLD AUTO: 69 CELLS/UL (ref 15–500)
EOSINOPHIL NFR BLD AUTO: 0.3 %
ERYTHROCYTE [DISTWIDTH] IN BLOOD BY AUTOMATED COUNT: 12.2 % (ref 11–15)
GLOBULIN SER CALC-MCNC: 2.5 G/DL (CALC) (ref 1.9–3.7)
GLUCOSE SERPL-MCNC: 92 MG/DL (ref 65–139)
HCT VFR BLD AUTO: 41 % (ref 38.5–50)
HGB BLD-MCNC: 13.9 G/DL (ref 13.2–17.1)
IGG SERPL-MCNC: 1244 MG/DL (ref 694–1618)
LDH SERPL-CCNC: 102 U/L (ref 120–250)
LYMPHOCYTES # BLD AUTO: ABNORMAL CELLS/UL (ref 850–3900)
LYMPHOCYTES NFR BLD AUTO: 86 %
MCH RBC QN AUTO: 30.3 PG (ref 27–33)
MCHC RBC AUTO-ENTMCNC: 33.9 G/DL (ref 32–36)
MCV RBC AUTO: 89.5 FL (ref 80–100)
MONOCYTES # BLD AUTO: 552 CELLS/UL (ref 200–950)
MONOCYTES NFR BLD AUTO: 2.4 %
NEUTROPHILS # BLD AUTO: 2484 CELLS/UL (ref 1500–7800)
NEUTROPHILS NFR BLD AUTO: 10.8 %
PLATELET # BLD AUTO: 167 THOUSAND/UL (ref 140–400)
PMV BLD REES-ECKER: 12.1 FL (ref 7.5–12.5)
POTASSIUM SERPL-SCNC: 5 MMOL/L (ref 3.5–5.3)
PROT SERPL-MCNC: 6.8 G/DL (ref 6.1–8.1)
RBC # BLD AUTO: 4.58 MILLION/UL (ref 4.2–5.8)
SERVICE CMNT-IMP: ABNORMAL
SL AMB EGFR AFRICAN AMERICAN: 112 ML/MIN/1.73M2
SL AMB EGFR NON AFRICAN AMERICAN: 97 ML/MIN/1.73M2
SODIUM SERPL-SCNC: 141 MMOL/L (ref 135–146)
URATE SERPL-MCNC: 7.2 MG/DL (ref 4–8)
WBC # BLD AUTO: 23 THOUSAND/UL (ref 3.8–10.8)

## 2019-01-30 DIAGNOSIS — C91.10 CHRONIC LYMPHOCYTIC LEUKEMIA OF B-CELL TYPE NOT HAVING ACHIEVED REMISSION (HCC): Primary | ICD-10-CM

## 2019-01-30 NOTE — PROGRESS NOTES
Patient called and stated that he is back from [facility] and if we could send scripts for blood test to [facility]  Fax [phone]   My  will fax the scripts and also give patient a follow-up appointment    See orders

## 2019-01-31 LAB
ALBUMIN SERPL-MCNC: 4.7 G/DL (ref 3.6–5.1)
ALBUMIN/GLOB SERPL: 1.8 (CALC) (ref 1–2.5)
ALP SERPL-CCNC: 83 U/L (ref 40–115)
ALT SERPL-CCNC: 22 U/L (ref 9–46)
AST SERPL-CCNC: 21 U/L (ref 10–35)
BASOPHILS # BLD AUTO: 158 CELLS/UL (ref 0–200)
BASOPHILS NFR BLD AUTO: 0.5 %
BILIRUB SERPL-MCNC: 1.1 MG/DL (ref 0.2–1.2)
BUN SERPL-MCNC: 11 MG/DL (ref 7–25)
BUN/CREAT SERPL: NORMAL (CALC) (ref 6–22)
CALCIUM SERPL-MCNC: 9.5 MG/DL (ref 8.6–10.3)
CHLORIDE SERPL-SCNC: 103 MMOL/L (ref 98–110)
CO2 SERPL-SCNC: 30 MMOL/L (ref 20–32)
CREAT SERPL-MCNC: 0.78 MG/DL (ref 0.7–1.33)
EOSINOPHIL # BLD AUTO: 95 CELLS/UL (ref 15–500)
EOSINOPHIL NFR BLD AUTO: 0.3 %
ERYTHROCYTE [DISTWIDTH] IN BLOOD BY AUTOMATED COUNT: 12.6 % (ref 11–15)
GLOBULIN SER CALC-MCNC: 2.6 G/DL (CALC) (ref 1.9–3.7)
GLUCOSE SERPL-MCNC: 100 MG/DL (ref 65–139)
HCT VFR BLD AUTO: 43.6 % (ref 38.5–50)
HGB BLD-MCNC: 14.7 G/DL (ref 13.2–17.1)
IGG SERPL-MCNC: 1382 MG/DL (ref 694–1618)
LDH SERPL-CCNC: 120 U/L (ref 120–250)
LYMPHOCYTES # BLD AUTO: ABNORMAL CELLS/UL (ref 850–3900)
LYMPHOCYTES NFR BLD AUTO: 87 %
MCH RBC QN AUTO: 30.3 PG (ref 27–33)
MCHC RBC AUTO-ENTMCNC: 33.7 G/DL (ref 32–36)
MCV RBC AUTO: 89.9 FL (ref 80–100)
MONOCYTES # BLD AUTO: 725 CELLS/UL (ref 200–950)
MONOCYTES NFR BLD AUTO: 2.3 %
NEUTROPHILS # BLD AUTO: 3119 CELLS/UL (ref 1500–7800)
NEUTROPHILS NFR BLD AUTO: 9.9 %
PLATELET # BLD AUTO: 174 THOUSAND/UL (ref 140–400)
PMV BLD REES-ECKER: 12.2 FL (ref 7.5–12.5)
POTASSIUM SERPL-SCNC: 4.6 MMOL/L (ref 3.5–5.3)
PROT SERPL-MCNC: 7.3 G/DL (ref 6.1–8.1)
RBC # BLD AUTO: 4.85 MILLION/UL (ref 4.2–5.8)
SERVICE CMNT-IMP: ABNORMAL
SL AMB EGFR AFRICAN AMERICAN: 117 ML/MIN/1.73M2
SL AMB EGFR NON AFRICAN AMERICAN: 101 ML/MIN/1.73M2
SODIUM SERPL-SCNC: 138 MMOL/L (ref 135–146)
URATE SERPL-MCNC: 6.8 MG/DL (ref 4–8)
WBC # BLD AUTO: 31.5 THOUSAND/UL (ref 3.8–10.8)

## 2019-02-02 ENCOUNTER — TELEPHONE (OUTPATIENT)
Dept: HEMATOLOGY ONCOLOGY | Facility: CLINIC | Age: 59
End: 2019-02-02

## 2019-02-02 NOTE — TELEPHONE ENCOUNTER
I spoke with patient last evening and gave him results of his blood tests and suggested getting blood counts checked again in mid March    Patient will be calling me in mid March to order blood test for him

## 2019-05-22 LAB
ALBUMIN SERPL-MCNC: 4.5 G/DL (ref 3.6–5.1)
ALBUMIN/GLOB SERPL: 1.7 (CALC) (ref 1–2.5)
ALP SERPL-CCNC: 78 U/L (ref 40–115)
ALT SERPL-CCNC: 18 U/L (ref 9–46)
AST SERPL-CCNC: 20 U/L (ref 10–35)
BASOPHILS # BLD AUTO: 138 CELLS/UL (ref 0–200)
BASOPHILS NFR BLD AUTO: 0.4 %
BILIRUB SERPL-MCNC: 1.2 MG/DL (ref 0.2–1.2)
BUN SERPL-MCNC: 10 MG/DL (ref 7–25)
BUN/CREAT SERPL: NORMAL (CALC) (ref 6–22)
CALCIUM SERPL-MCNC: 9.7 MG/DL (ref 8.6–10.3)
CHLORIDE SERPL-SCNC: 106 MMOL/L (ref 98–110)
CO2 SERPL-SCNC: 30 MMOL/L (ref 20–32)
CREAT SERPL-MCNC: 0.84 MG/DL (ref 0.7–1.33)
EOSINOPHIL # BLD AUTO: 104 CELLS/UL (ref 15–500)
EOSINOPHIL NFR BLD AUTO: 0.3 %
ERYTHROCYTE [DISTWIDTH] IN BLOOD BY AUTOMATED COUNT: 12.5 % (ref 11–15)
GLOBULIN SER CALC-MCNC: 2.6 G/DL (CALC) (ref 1.9–3.7)
GLUCOSE SERPL-MCNC: 95 MG/DL (ref 65–99)
HCT VFR BLD AUTO: 43.9 % (ref 38.5–50)
HGB BLD-MCNC: 14.5 G/DL (ref 13.2–17.1)
IGG SERPL-MCNC: 1317 MG/DL (ref 694–1618)
LDH SERPL-CCNC: 110 U/L (ref 120–250)
LYMPHOCYTES # BLD AUTO: ABNORMAL CELLS/UL (ref 850–3900)
LYMPHOCYTES NFR BLD AUTO: 85.2 %
MCH RBC QN AUTO: 29.2 PG (ref 27–33)
MCHC RBC AUTO-ENTMCNC: 33 G/DL (ref 32–36)
MCV RBC AUTO: 88.3 FL (ref 80–100)
MONOCYTES # BLD AUTO: 2381 CELLS/UL (ref 200–950)
MONOCYTES NFR BLD AUTO: 6.9 %
NEUTROPHILS # BLD AUTO: 2484 CELLS/UL (ref 1500–7800)
NEUTROPHILS NFR BLD AUTO: 7.2 %
PLATELET # BLD AUTO: 167 THOUSAND/UL (ref 140–400)
PMV BLD REES-ECKER: 11.6 FL (ref 7.5–12.5)
POTASSIUM SERPL-SCNC: 5.2 MMOL/L (ref 3.5–5.3)
PROT SERPL-MCNC: 7.1 G/DL (ref 6.1–8.1)
RBC # BLD AUTO: 4.97 MILLION/UL (ref 4.2–5.8)
SERVICE CMNT-IMP: ABNORMAL
SL AMB EGFR AFRICAN AMERICAN: 113 ML/MIN/1.73M2
SL AMB EGFR NON AFRICAN AMERICAN: 97 ML/MIN/1.73M2
SODIUM SERPL-SCNC: 139 MMOL/L (ref 135–146)
URATE SERPL-MCNC: 7.1 MG/DL (ref 4–8)
WBC # BLD AUTO: 34.5 THOUSAND/UL (ref 3.8–10.8)

## 2019-06-21 ENCOUNTER — TELEPHONE (OUTPATIENT)
Dept: HEMATOLOGY ONCOLOGY | Facility: CLINIC | Age: 59
End: 2019-06-21

## 2019-07-02 LAB
ALBUMIN SERPL-MCNC: 4.4 G/DL (ref 3.6–5.1)
ALBUMIN/GLOB SERPL: 1.8 (CALC) (ref 1–2.5)
ALP SERPL-CCNC: 74 U/L (ref 40–115)
ALT SERPL-CCNC: 18 U/L (ref 9–46)
AST SERPL-CCNC: 19 U/L (ref 10–35)
BASOPHILS # BLD AUTO: 0 CELLS/UL (ref 0–200)
BASOPHILS NFR BLD AUTO: 0 %
BILIRUB SERPL-MCNC: 1.1 MG/DL (ref 0.2–1.2)
BUN SERPL-MCNC: 11 MG/DL (ref 7–25)
BUN/CREAT SERPL: NORMAL (CALC) (ref 6–22)
CALCIUM SERPL-MCNC: 9.4 MG/DL (ref 8.6–10.3)
CHLORIDE SERPL-SCNC: 105 MMOL/L (ref 98–110)
CO2 SERPL-SCNC: 27 MMOL/L (ref 20–32)
CREAT SERPL-MCNC: 0.89 MG/DL (ref 0.7–1.33)
EOSINOPHIL # BLD AUTO: 0 CELLS/UL (ref 15–500)
EOSINOPHIL NFR BLD AUTO: 0 %
ERYTHROCYTE [DISTWIDTH] IN BLOOD BY AUTOMATED COUNT: 13 % (ref 11–15)
GLOBULIN SER CALC-MCNC: 2.5 G/DL (CALC) (ref 1.9–3.7)
GLUCOSE SERPL-MCNC: 87 MG/DL (ref 65–139)
HCT VFR BLD AUTO: 40.9 % (ref 38.5–50)
HGB BLD-MCNC: 13.6 G/DL (ref 13.2–17.1)
IGG SERPL-MCNC: 1241 MG/DL (ref 600–1640)
LDH SERPL-CCNC: 103 U/L (ref 120–250)
LYMPHOCYTES # BLD AUTO: ABNORMAL CELLS/UL (ref 850–3900)
LYMPHOCYTES NFR BLD AUTO: 82 %
MCH RBC QN AUTO: 29.8 PG (ref 27–33)
MCHC RBC AUTO-ENTMCNC: 33.3 G/DL (ref 32–36)
MCV RBC AUTO: 89.5 FL (ref 80–100)
MONOCYTES # BLD AUTO: 658 CELLS/UL (ref 200–950)
MONOCYTES NFR BLD AUTO: 2 %
NEUTROPHILS # BLD AUTO: 5264 CELLS/UL (ref 1500–7800)
NEUTROPHILS NFR BLD AUTO: 16 %
PLATELET # BLD AUTO: 136 THOUSAND/UL (ref 140–400)
PMV BLD REES-ECKER: 12.9 FL (ref 7.5–12.5)
POTASSIUM SERPL-SCNC: 5 MMOL/L (ref 3.5–5.3)
PROT SERPL-MCNC: 6.9 G/DL (ref 6.1–8.1)
RBC # BLD AUTO: 4.57 MILLION/UL (ref 4.2–5.8)
SERVICE CMNT-IMP: ABNORMAL
SL AMB EGFR AFRICAN AMERICAN: 110 ML/MIN/1.73M2
SL AMB EGFR NON AFRICAN AMERICAN: 95 ML/MIN/1.73M2
SODIUM SERPL-SCNC: 139 MMOL/L (ref 135–146)
URATE SERPL-MCNC: 6.7 MG/DL (ref 4–8)
WBC # BLD AUTO: 32.9 THOUSAND/UL (ref 3.8–10.8)

## 2019-07-08 ENCOUNTER — TELEPHONE (OUTPATIENT)
Dept: HEMATOLOGY ONCOLOGY | Facility: CLINIC | Age: 59
End: 2019-07-08

## 2019-09-09 ENCOUNTER — TELEPHONE (OUTPATIENT)
Dept: HEMATOLOGY ONCOLOGY | Facility: CLINIC | Age: 59
End: 2019-09-09

## 2019-09-10 NOTE — TELEPHONE ENCOUNTER
Sent letter to address on file  Called patient and let him know this information, patient voiced understanding  Please review letter for additional information

## 2019-11-20 ENCOUNTER — TELEPHONE (OUTPATIENT)
Dept: HEMATOLOGY ONCOLOGY | Facility: CLINIC | Age: 59
End: 2019-11-20

## 2019-11-20 NOTE — TELEPHONE ENCOUNTER
Patient wife Madyson Montalvo called in to verify this Friday's appointment   I verified date, time and location,

## 2019-11-21 ENCOUNTER — TELEPHONE (OUTPATIENT)
Dept: HEMATOLOGY ONCOLOGY | Facility: CLINIC | Age: 59
End: 2019-11-21

## 2020-01-15 ENCOUNTER — OFFICE VISIT (OUTPATIENT)
Dept: HEMATOLOGY ONCOLOGY | Facility: CLINIC | Age: 60
End: 2020-01-15
Payer: COMMERCIAL

## 2020-01-15 VITALS
HEART RATE: 66 BPM | BODY MASS INDEX: 22.13 KG/M2 | RESPIRATION RATE: 16 BRPM | HEIGHT: 68 IN | SYSTOLIC BLOOD PRESSURE: 128 MMHG | DIASTOLIC BLOOD PRESSURE: 78 MMHG | TEMPERATURE: 98.2 F | OXYGEN SATURATION: 97 % | WEIGHT: 146 LBS

## 2020-01-15 DIAGNOSIS — C91.10 CHRONIC LYMPHOCYTIC LEUKEMIA OF B-CELL TYPE NOT HAVING ACHIEVED REMISSION (HCC): Primary | ICD-10-CM

## 2020-01-15 DIAGNOSIS — R63.4 WEIGHT LOSS: ICD-10-CM

## 2020-01-15 PROCEDURE — 99214 OFFICE O/P EST MOD 30 MIN: CPT | Performed by: INTERNAL MEDICINE

## 2020-01-15 NOTE — PROGRESS NOTES
HPI:  Follow-up visit for 17 P deletion  and on mutated Ig VH stage 0 CLL that has not required therapy and there has not been disease progression  He has lost 6 pounds in 6 months that is after he started limiting wheat and sugar products  He is receiving some sort of naturopathy from a physician  No other symptoms    Current Outpatient Medications:     aspirin (ECOTRIN LOW STRENGTH) 81 mg EC tablet, Take 81 mg by mouth daily, Disp: , Rfl:     atenolol (TENORMIN) 25 mg tablet, Take 25 mg by mouth daily, Disp: , Rfl:     cholecalciferol (VITAMIN D3) 1,000 units tablet, Take 1,000 Units by mouth daily, Disp: , Rfl:     No Known Allergies     No history exists  ROS:  01/15/20 Reviewed 13 systems:  Presently no headaches, seizures, dizziness, diplopia, dysphagia, hoarseness, chest pain, palpitations, shortness of breath, cough, hemoptysis, abdominal pain, nausea, vomiting, change in bowel habits, melena, hematuria, fever, chills, bleeding, bone pains, skin rash,  arthritic symptoms, tiredness ,  weakness, numbness,  claudication and gait problem  No frequent infections  Not unusually sensitive to heat or cold  No swelling of the ankles  No swollen glands  Patient is anxious  Other symptoms are in HPI        /78 (BP Location: Left arm, Patient Position: Sitting, Cuff Size: Adult)   Pulse 66   Temp 98 2 °F (36 8 °C)   Resp 16   Ht 5' 8" (1 727 m)   Wt 66 2 kg (146 lb)   SpO2 97%   BMI 22 20 kg/m²     Physical Exam:  Alert, oriented, not in distress, no icterus, no oral thrush, no palpable neck mass except for 1 small superficial 1 centimeter lymph node left upper neck posteriorly, clear lung fields, regular heart rate, abdomen  soft and non tender, no palpable abdominal mass, no ascites, no edema of ankles, no calf tenderness, no focal neurological deficit, no skin rash, no other palpable lymphadenopathy, good arterial pulses, no clubbing  Patient is anxious    Performance status 0  IMAGING:  Most recent blood tests done at [address] [place] [address] showed hemoglobin 13 6  Hematocrit 41 6  MCV 90  WBC 23,500 with 84 percent lymphocyte and no immature cell  Platelet count [id number]   No chemistry profile except for normal SGOT and SGPT    LABS:  Results for orders placed or performed in visit on 07/01/19   LD,Blood   Result Value Ref Range     (L) 120 - 250 U/L   Uric acid   Result Value Ref Range    Uric Acid 6 7 4 0 - 8 0 mg/dL   Comprehensive metabolic panel   Result Value Ref Range    Glucose, Random 87 65 - 139 mg/dL    BUN 11 7 - 25 mg/dL    Creatinine 0 89 0 70 - 1 33 mg/dL    eGFR Non  95 > OR = 60 mL/min/1 73m2    eGFR  110 > OR = 60 mL/min/1 73m2    SL AMB BUN/CREATININE RATIO NOT APPLICABLE 6 - 22 (calc)    Sodium 139 135 - 146 mmol/L    Potassium 5 0 3 5 - 5 3 mmol/L    Chloride 105 98 - 110 mmol/L    CO2 27 20 - 32 mmol/L    SL AMB CALCIUM 9 4 8 6 - 10 3 mg/dL    Protein, Total 6 9 6 1 - 8 1 g/dL    Albumin 4 4 3 6 - 5 1 g/dL    Globulin 2 5 1 9 - 3 7 g/dL (calc)    Albumin/Globulin Ratio 1 8 1 0 - 2 5 (calc)    TOTAL BILIRUBIN 1 1 0 2 - 1 2 mg/dL    Alkaline Phosphatase 74 40 - 115 U/L    AST 19 10 - 35 U/L    ALT 18 9 - 46 U/L   CBC and differential   Result Value Ref Range    White Blood Cell Count 32 9 (H) 3 8 - 10 8 Thousand/uL    Red Blood Cell Count 4 57 4 20 - 5 80 Million/uL    Hemoglobin 13 6 13 2 - 17 1 g/dL    HCT 40 9 38 5 - 50 0 %    MCV 89 5 80 0 - 100 0 fL    MCH 29 8 27 0 - 33 0 pg    MCHC 33 3 32 0 - 36 0 g/dL    RDW 13 0 11 0 - 15 0 %    Platelet Count 986 (L) 140 - 400 Thousand/uL    SL AMB MPV 12 9 (H) 7 5 - 12 5 fL    Neutrophils (Absolute) 5,264 1,500 - 7,800 cells/uL    Lymphocytes (Absolute) 26,978 (H) 850 - 3,900 cells/uL    Monocytes (Absolute) 658 200 - 950 cells/uL    Eosinophils (Absolute) 0 (L) 15 - 500 cells/uL    Basophils ABS 0 0 - 200 cells/uL    Neutrophils 16 %    Lymphocytes 82 %    Monocytes 2 %    Eosinophils 0 % Basophils PCT 0 %    Comment(s) Few abnormal lymphocytes noted    IgG   Result Value Ref Range    IgG 1,241 600 - 1,640 mg/dL     Labs, Imaging, & Other studies:   All pertinent labs and imaging studies were personally reviewed    Lab Results   Component Value Date    K 5 0 07/01/2019     07/01/2019    CO2 27 07/01/2019    BUN 11 07/01/2019    CREATININE 0 89 07/01/2019    GLUF 88 08/06/2018    CALCIUM 9 4 07/01/2019    AST 19 07/01/2019    ALT 18 07/01/2019    ALKPHOS 74 07/01/2019    EGFR 94 08/06/2018     Lab Results   Component Value Date    WBC 32 9 (H) 07/01/2019    HGB 13 6 07/01/2019    HCT 40 9 07/01/2019    MCV 89 5 07/01/2019     (L) 07/01/2019       Reviewed and discussed with patient  Assessment and plan: Follow-up visit for 17 P deletion  and on mutated Ig VH stage 0 CLL that has not required therapy and there has not been disease progression  He has lost 6 pounds in 6 months that is after he started limiting wheat and sugar products  He is receiving some sort of naturopathy from a physician  No other symptoms    Physical examination and test results are as recorded and discussed  Clinically no change in stage and no progression of disease  Weight loss could be secondary to change in diet  Patient will have blood tests every 3 months  He will go for ultrasound of abdomen to check liver, spleen and lymph nodes  He would like to come back in 4 months  Condition discussed and explained  Questions answered  Even though he has to unfavorable features, 17 P deletion and unmutated IVIG his disease is not behaving aggressively and I explained that to him  No indication to treat CLL at this time  Discussed treatment indications  Discussed the importance of eating healthy foods, staying active and health screening tests  He is capable of self-care    1  Chronic lymphocytic leukemia of B-cell type not having achieved remission (HCC)    - CBC and differential; Standing  - Comprehensive metabolic panel; Standing  - IgG, IgA, IgM; Standing  - LD,Blood; Standing  - Uric acid; Standing  - CBC and differential  - Comprehensive metabolic panel  - IgG, IgA, IgM  - LD,Blood  - Uric acid  - US abdomen complete; Future    2  Weight loss            Patient voiced understanding and agreement in the discussion  Counseling / Coordination of Care   Greater than 50% of total time was spent with the patient and / or family counseling and / or coordination of care

## 2020-03-05 ENCOUNTER — TRANSCRIBE ORDERS (OUTPATIENT)
Dept: PHYSICAL THERAPY | Facility: MEDICAL CENTER | Age: 60
End: 2020-03-05

## 2020-03-05 ENCOUNTER — EVALUATION (OUTPATIENT)
Dept: PHYSICAL THERAPY | Facility: MEDICAL CENTER | Age: 60
End: 2020-03-05
Payer: COMMERCIAL

## 2020-03-05 DIAGNOSIS — M75.01 ADHESIVE CAPSULITIS OF RIGHT SHOULDER: Primary | ICD-10-CM

## 2020-03-05 DIAGNOSIS — M25.511 RIGHT SHOULDER PAIN, UNSPECIFIED CHRONICITY: ICD-10-CM

## 2020-03-05 PROCEDURE — 97161 PT EVAL LOW COMPLEX 20 MIN: CPT | Performed by: PHYSICAL THERAPIST

## 2020-03-05 NOTE — LETTER
2020    Rosine Apgar, MD  [address] [place] [address] [place] [id number]    Patient: Shandra Moore   YOB: 1960   Date of Visit: 3/5/2020     Encounter Diagnosis     ICD-10-CM    1  Adhesive capsulitis of right shoulder M75 01    2  Right shoulder pain, unspecified chronicity M25 511        Dear Dr Yan Royalty:    Thank you for your recent referral of Shandra Moore  Please review the attached evaluation summary from Jcarlos's recent visit  Please verify that you agree with the plan of care by signing the attached order  If you have any questions or concerns, please do not hesitate to call  I sincerely appreciate the opportunity to share in the care of one of your patients and hope to have another opportunity to work with you in the near future  Sincerely,    Ramsey Vail, PT      Referring Provider:      I certify that I have read the below Plan of Care and certify the need for these services furnished under this plan of treatment while under my care  Rosine Apgar, MD  [address] [place] [address],[address]: [phone]          PT Evaluation     Today's date: 3/5/2020  Patient name: Shandra Moore  : 1960  MRN: [id number]  Referring provider: Jere Paredes*  Dx:   Encounter Diagnosis     ICD-10-CM    1  Adhesive capsulitis of right shoulder M75 01    2  Right shoulder pain, unspecified chronicity M25 511             Assessment  Assessment details: Pt is a pleasant 65 yo male presenting to physical therapy with R shoulder adhesive capsulitis  Pt would benefit from skilled PT to address current impairments and return pt to pre-morbid function    Understanding of Dx/Px/POC: good   Prognosis: good    Goals  Impairment Goals  - Pt I with initial HEP in 1-2 visits  - Improve ROM equal to contralateral side in 4-6 weeks  - Increase strength to 5/5 in all affected areas in 4-6 weeks    Functional Goals  - Increase FOTO to at least 76 in 8 weeks  - Patient will be independent with comprehensive HEP in 8 weeks  - Patient will be able to reach for object from shelf at shoulder height with min reports of difficulty in 2-4 weeks  - Patient will be able to reach for object overhead with min to difficulty in 6-8 weeks  - Patient will be able to lift/carry objects without provocation of symptoms in 6-8 weeks        Plan  Patient would benefit from: skilled physical therapy  Other planned modality interventions: Modalities prn  Planned therapy interventions: manual therapy, neuromuscular re-education, patient education, postural training, strengthening, stretching, therapeutic activities, therapeutic exercise and home exercise program  Frequency: 2x week  Duration in weeks: 8  Treatment plan discussed with: patient        Subjective Evaluation    History of Present Illness  Mechanism of injury: Pt reports that he has had pain for 4 months  His pain has been relatively stable and he only had the pain during the day with certain movements  Pt notes that over time his ROM has decreased  Pain with sleeping was getting worse until he received an injection last week  Pain and ROM are improved since getting the injection  Pain  Current pain ratin  At best pain ratin  At worst pain ratin    Social Support    Working: Semi-retired, seated computer work    Hand dominance: right  Exercise history: walking, dumbell wt lifting for arms      Diagnostic Tests  X-ray: normal  Treatments  Previous treatment: injection treatment  Patient Goals  Patient goals for therapy: decreased pain, increased motion, increased strength and return to sport/leisure activities          Objective     Postural Observations    Additional Postural Observation Details  + scapular protraction, anterior tilting and winging B    Cervical/Thoracic Screen   Cervical range of motion within normal limits  Thoracic range of motion within normal limits    Neurological Testing     Sensation     Shoulder   Left Shoulder   Intact: light touch    Right Shoulder   Intact: light touch    Active Range of Motion   Left Shoulder   Flexion: 140 degrees   Abduction: 150 degrees     Right Shoulder   Flexion: 100 degrees   Abduction: 75 degrees     Passive Range of Motion   Left Shoulder   External rotation 90°:  100 degrees   Internal rotation 90°:  45 degrees     Right Shoulder   Flexion: 105 degrees   Abduction: 80 degrees   External rotation 90°:  30 degrees   Internal rotation 45°:  10 degrees     Joint Play   Left Shoulder  Joints within functional limits are the anterior capsule, posterior capsule and inferior capsule  Right Shoulder  Hypomobile in the anterior capsule, posterior capsule and inferior capsule       Strength/Myotome Testing     Left Shoulder     Planes of Motion   Flexion: 5   Abduction: 5   External rotation at 0°:  5   Internal rotation at 0°:  5     Right Shoulder     Planes of Motion   Flexion: 2+   Abduction: 2+   External rotation at 0°:  4+   Internal rotation at 0°:  4     Additional Strength Details  Elbow flexion and extension: 5/5 B             Precautions: None      Manual  3/5            GH jt mobs all planes HK            PROM HK                                                       Exercise Diary  3/5            UBE NV            Pullyes NV            Table slide flexion and ER IP            BTB IR str NV                                                                                                                                                                                                                                Modalities  3/5             15'

## 2020-03-05 NOTE — PROGRESS NOTES
PT Evaluation     Today's date: 3/5/2020  Patient name: Shandra Moore  : 1960  MRN: [id number]  Referring provider: Jere Paredes*  Dx:   Encounter Diagnosis     ICD-10-CM    1  Adhesive capsulitis of right shoulder M75 01    2  Right shoulder pain, unspecified chronicity M25 511             Assessment  Assessment details: Pt is a pleasant 65 yo male presenting to physical therapy with R shoulder adhesive capsulitis  Pt would benefit from skilled PT to address current impairments and return pt to pre-morbid function  Understanding of Dx/Px/POC: good   Prognosis: good    Goals  Impairment Goals  - Pt I with initial HEP in 1-2 visits  - Improve ROM equal to contralateral side in 4-6 weeks  - Increase strength to 5/5 in all affected areas in 4-6 weeks    Functional Goals  - Increase FOTO to at least 76 in 8 weeks  - Patient will be independent with comprehensive HEP in 8 weeks  - Patient will be able to reach for object from shelf at shoulder height with min reports of difficulty in 2-4 weeks  - Patient will be able to reach for object overhead with min to difficulty in 6-8 weeks  - Patient will be able to lift/carry objects without provocation of symptoms in 6-8 weeks        Plan  Patient would benefit from: skilled physical therapy  Other planned modality interventions: Modalities prn  Planned therapy interventions: manual therapy, neuromuscular re-education, patient education, postural training, strengthening, stretching, therapeutic activities, therapeutic exercise and home exercise program  Frequency: 2x week  Duration in weeks: 8  Treatment plan discussed with: patient        Subjective Evaluation    History of Present Illness  Mechanism of injury: Pt reports that he has had pain for 4 months  His pain has been relatively stable and he only had the pain during the day with certain movements  Pt notes that over time his ROM has decreased    Pain with sleeping was getting worse until he received an injection last week  Pain and ROM are improved since getting the injection  Pain  Current pain ratin  At best pain ratin  At worst pain ratin    Social Support    Working: Semi-retired, seated computer work  Hand dominance: right  Exercise history: walking, dumbell wt lifting for arms      Diagnostic Tests  X-ray: normal  Treatments  Previous treatment: injection treatment  Patient Goals  Patient goals for therapy: decreased pain, increased motion, increased strength and return to sport/leisure activities          Objective     Postural Observations    Additional Postural Observation Details  + scapular protraction, anterior tilting and winging B    Cervical/Thoracic Screen   Cervical range of motion within normal limits  Thoracic range of motion within normal limits    Neurological Testing     Sensation     Shoulder   Left Shoulder   Intact: light touch    Right Shoulder   Intact: light touch    Active Range of Motion   Left Shoulder   Flexion: 140 degrees   Abduction: 150 degrees     Right Shoulder   Flexion: 100 degrees   Abduction: 75 degrees     Passive Range of Motion   Left Shoulder   External rotation 90°: 100 degrees   Internal rotation 90°: 45 degrees     Right Shoulder   Flexion: 105 degrees   Abduction: 80 degrees   External rotation 90°: 30 degrees   Internal rotation 45°: 10 degrees     Joint Play   Left Shoulder  Joints within functional limits are the anterior capsule, posterior capsule and inferior capsule  Right Shoulder  Hypomobile in the anterior capsule, posterior capsule and inferior capsule       Strength/Myotome Testing     Left Shoulder     Planes of Motion   Flexion: 5   Abduction: 5   External rotation at 0°: 5   Internal rotation at 0°: 5     Right Shoulder     Planes of Motion   Flexion: 2+   Abduction: 2+   External rotation at 0°: 4+   Internal rotation at 0°: 4     Additional Strength Details  Elbow flexion and extension: 5/5 B             Precautions: None      Manual  3/5            GH jt mobs all planes HK            PROM HK                                                       Exercise Diary  3/5            UBE NV            Pullyes NV            Table slide flexion and ER IP            BTB IR str NV                                                                                                                                                                                                                                Modalities  3/5             15'

## 2020-03-09 ENCOUNTER — APPOINTMENT (OUTPATIENT)
Dept: PHYSICAL THERAPY | Facility: MEDICAL CENTER | Age: 60
End: 2020-03-09
Payer: COMMERCIAL

## 2020-03-12 ENCOUNTER — APPOINTMENT (OUTPATIENT)
Dept: PHYSICAL THERAPY | Facility: MEDICAL CENTER | Age: 60
End: 2020-03-12
Payer: COMMERCIAL

## 2020-03-16 ENCOUNTER — APPOINTMENT (OUTPATIENT)
Dept: PHYSICAL THERAPY | Facility: MEDICAL CENTER | Age: 60
End: 2020-03-16
Payer: COMMERCIAL

## 2020-03-19 ENCOUNTER — APPOINTMENT (OUTPATIENT)
Dept: PHYSICAL THERAPY | Facility: MEDICAL CENTER | Age: 60
End: 2020-03-19
Payer: COMMERCIAL

## 2020-03-23 ENCOUNTER — APPOINTMENT (OUTPATIENT)
Dept: PHYSICAL THERAPY | Facility: MEDICAL CENTER | Age: 60
End: 2020-03-23
Payer: COMMERCIAL

## 2020-03-26 ENCOUNTER — APPOINTMENT (OUTPATIENT)
Dept: PHYSICAL THERAPY | Facility: MEDICAL CENTER | Age: 60
End: 2020-03-26
Payer: COMMERCIAL

## 2020-03-30 ENCOUNTER — APPOINTMENT (OUTPATIENT)
Dept: PHYSICAL THERAPY | Facility: MEDICAL CENTER | Age: 60
End: 2020-03-30
Payer: COMMERCIAL

## 2020-05-18 ENCOUNTER — TELEPHONE (OUTPATIENT)
Dept: HEMATOLOGY ONCOLOGY | Facility: CLINIC | Age: 60
End: 2020-05-18

## 2020-06-26 ENCOUNTER — TELEPHONE (OUTPATIENT)
Dept: HEMATOLOGY ONCOLOGY | Facility: CLINIC | Age: 60
End: 2020-06-26

## 2020-06-30 ENCOUNTER — TELEPHONE (OUTPATIENT)
Dept: HEMATOLOGY ONCOLOGY | Facility: CLINIC | Age: 60
End: 2020-06-30

## 2020-07-01 ENCOUNTER — OFFICE VISIT (OUTPATIENT)
Dept: HEMATOLOGY ONCOLOGY | Facility: CLINIC | Age: 60
End: 2020-07-01

## 2020-07-01 VITALS
DIASTOLIC BLOOD PRESSURE: 80 MMHG | BODY MASS INDEX: 20.61 KG/M2 | HEART RATE: 72 BPM | RESPIRATION RATE: 16 BRPM | OXYGEN SATURATION: 99 % | SYSTOLIC BLOOD PRESSURE: 132 MMHG | HEIGHT: 68 IN | TEMPERATURE: 97.5 F | WEIGHT: 136 LBS

## 2020-07-01 DIAGNOSIS — R63.4 WEIGHT LOSS: ICD-10-CM

## 2020-07-01 DIAGNOSIS — C91.10 CHRONIC LYMPHOCYTIC LEUKEMIA OF B-CELL TYPE NOT HAVING ACHIEVED REMISSION (HCC): Primary | ICD-10-CM

## 2020-07-01 PROCEDURE — 99214 OFFICE O/P EST MOD 30 MIN: CPT | Performed by: INTERNAL MEDICINE

## 2020-07-01 NOTE — PROGRESS NOTES
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)  He has been trying [facility] herbal medications  He states he has stopped doing that now  WBC count and lymphocyte counts have increased significantly from last time, almost double  Weight loss could be an indication for treatment and I discussed that with the patient  He has lost 10 lb in 6 months  He will like to come back in 2 months with blood tests                  Current Outpatient Medications:     aspirin (ECOTRIN LOW STRENGTH) 81 mg EC tablet, Take 81 mg by mouth daily, Disp: , Rfl:     atenolol (TENORMIN) 25 mg tablet, Take 25 mg by mouth daily, Disp: , Rfl:     cholecalciferol (VITAMIN D3) 1,000 units tablet, Take 1,000 Units by mouth daily, Disp: , Rfl:     No Known Allergies     No history exists  ROS:  07/01/20 Reviewed 13 systems: See symptoms in HPI:  Tiredness occasionally and weight loss  Presently no headaches, seizures, dizziness, diplopia, dysphagia, hoarseness, chest pain, palpitations, shortness of breath, cough, hemoptysis, abdominal pain, nausea, vomiting, change in bowel habits, melena, hematuria, fever, chills, bleeding, bone pains, skin rash, arthritic symptoms,  weakness, numbness,  claudication and gait problem  No frequent infections  Not unusually sensitive to heat or cold  No swelling of the ankles  No swollen glands  Patient is anxious         /80 (BP Location: Right arm, Patient Position: Sitting, Cuff Size: Adult)   Pulse 72   Temp 97 5 °F (36 4 °C)   Resp 16   Ht 5' 8" (1 727 m)   Wt 61 7 kg (136 lb)   SpO2 99%   BMI 20 68 kg/m²     Physical Exam:  Alert, oriented, not in distress, no icterus, no oral thrush, no palpable neck mass, clear lung fields, regular heart rate, abdomen  soft and non tender, no palpable abdominal mass, no ascites, no edema of ankles, no calf tenderness, no focal neurological deficit, no skin rash, no palpable lymphadenopathy, good arterial pulses  Patient is anxious  Performance status 0  IMAGING:  Collapse All  2020 June  US ABDOMEN [address] [facility]  Result Impression   Impression:     1   Mild increased echogenicity of the liver, likely due to mild fatty liver  2   Mild CBD dilatation measuring up to 7 mm  No obstructing stone or mass is  seen in the visualized portions of the proximal and mid CBD  Distal CBD is not  well evaluated  Further evaluation with MRI/MRCP can be considered  Workstation:[id number]   Result Narrative   History: Weight loss    Exam: Ultrasound of the abdomen was performed  Comparison: None  Findings:    Liver: Slightly increased echogenicity  0 5 cm and 0 5 cm calcifications in the  right hepatic lobe, most likely granulomas  Gallbladder: Within normal limits  No gallstones  Bile Ducts: No intra or extrahepatic biliary dilation  CBD measures 7 mm  Pancreas: Limited visualization due to bowel gas  Spleen: Within normal limits  The spleen measures 10 8 cm in length  Kidneys: Left kidney measures 11 cm in length  Right kidney measures 9 2 cm in  length  No evidence of stone or hydronephrosis bilaterally  0 5 x 0 4 x 0 4 cm  benign simple cyst in the right lower pole kidney  Aorta/IVC: The visualized upper abdominal aorta and IVC are normal in caliber  Other Result Information   Interface, Rad Results In - 06/30/2020  8:49 AM EDT  History: Weight loss    Exam: Ultrasound of the abdomen was performed  Comparison: None  Findings:    Liver: Slightly increased echogenicity  0 5 cm and 0 5 cm calcifications in the  right hepatic lobe, most likely granulomas  Gallbladder: Within normal limits  No gallstones  Bile Ducts: No intra or extrahepatic biliary dilation  CBD measures 7 mm  Pancreas: Limited visualization due to bowel gas  Spleen: Within normal limits   The spleen measures 10 8 cm in length  Kidneys: Left kidney measures 11 cm in length  Right kidney measures 9 2 cm in  length  No evidence of stone or hydronephrosis bilaterally  0 5 x 0 4 x 0 4 cm  benign simple cyst in the right lower pole kidney  Aorta/IVC: The visualized upper abdominal aorta and IVC are normal in caliber  IMPRESSION:  Impression:     1  Mild increased echogenicity of the liver, likely due to mild fatty liver  2   Mild CBD dilatation measuring up to 7 mm  No obstructing stone or mass is  seen in the visualized portions of the proximal and mid CBD  Distal CBD is not  well evaluated  Further evaluation with MRI/MRCP can be considered            Workstation:[id number]   Status Results Details     Encounter Summary         LABS:  Results for orders placed or performed in visit on 07/01/19   LD,Blood   Result Value Ref Range     (L) 120 - 250 U/L   Uric acid   Result Value Ref Range    Uric Acid 6 7 4 0 - 8 0 mg/dL   Comprehensive metabolic panel   Result Value Ref Range    Glucose, Random 87 65 - 139 mg/dL    BUN 11 7 - 25 mg/dL    Creatinine 0 89 0 70 - 1 33 mg/dL    eGFR Non  95 > OR = 60 mL/min/1 73m2    eGFR  110 > OR = 60 mL/min/1 73m2    SL AMB BUN/CREATININE RATIO NOT APPLICABLE 6 - 22 (calc)    Sodium 139 135 - 146 mmol/L    Potassium 5 0 3 5 - 5 3 mmol/L    Chloride 105 98 - 110 mmol/L    CO2 27 20 - 32 mmol/L    Calcium 9 4 8 6 - 10 3 mg/dL    Protein, Total 6 9 6 1 - 8 1 g/dL    Albumin 4 4 3 6 - 5 1 g/dL    Globulin 2 5 1 9 - 3 7 g/dL (calc)    Albumin/Globulin Ratio 1 8 1 0 - 2 5 (calc)    TOTAL BILIRUBIN 1 1 0 2 - 1 2 mg/dL    Alkaline Phosphatase 74 40 - 115 U/L    AST 19 10 - 35 U/L    ALT 18 9 - 46 U/L   CBC and differential   Result Value Ref Range    White Blood Cell Count 32 9 (H) 3 8 - 10 8 Thousand/uL    Red Blood Cell Count 4 57 4 20 - 5 80 Million/uL    Hemoglobin 13 6 13 2 - 17 1 g/dL    HCT 40 9 38 5 - 50 0 %    MCV 89 5 80 0 - 100 0 fL    MCH 29 8 27 0 - 33 0 pg    MCHC 33 3 32 0 - 36 0 g/dL    RDW 13 0 11 0 - 15 0 %    Platelet Count 892 (L) 140 - 400 Thousand/uL    SL AMB MPV 12 9 (H) 7 5 - 12 5 fL    Neutrophils (Absolute) 5,264 1,500 - 7,800 cells/uL    Lymphocytes (Absolute) 26,978 (H) 850 - 3,900 cells/uL    Monocytes (Absolute) 658 200 - 950 cells/uL    Eosinophils (Absolute) 0 (L) 15 - 500 cells/uL    Basophils ABS 0 0 - 200 cells/uL    Neutrophils 16 %    Lymphocytes 82 %    Monocytes 2 %    Eosinophils 0 %    Basophils PCT 0 %    Comment(s) Few abnormal lymphocytes noted    IgG   Result Value Ref Range    IgG 1,241 600 - 1,640 mg/dL     Labs, Imaging, & Other studies:   All pertinent labs and imaging studies were personally reviewed    Lab Results   Component Value Date    K 5 0 07/01/2019     07/01/2019    CO2 27 07/01/2019    BUN 11 07/01/2019    CREATININE 0 89 07/01/2019    GLUF 88 08/06/2018    CALCIUM 9 4 07/01/2019    AST 19 07/01/2019    ALT 18 07/01/2019    ALKPHOS 74 07/01/2019    EGFR 94 08/06/2018     Lab Results   Component Value Date    WBC 32 9 (H) 07/01/2019    HGB 13 6 07/01/2019    HCT 40 9 07/01/2019    MCV 89 5 07/01/2019     (L) 07/01/2019       On 06/29/2020 hemoglobin 13 1  WBC 39270  Platelets 396118  Lymphocyte 56,400  The on 06/10/2020 uric acid 5 8  Normal ALT and AST  Ferritin 47  Reviewed and discussed with patient  Assessment and plan:   Weight loss and some tiredness that too occasionally  History of stage 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)  He has been trying [facility] herbal medications  He states he has stopped doing that now  WBC count and lymphocyte counts have increased significantly from last time, almost double  Weight loss could be an indication for treatment and I discussed that with the patient  He has lost 10 lb in 6 months  He will like to come back in 2 months with blood tests         Physical examination and test results are as recorded and discussed  Clinically no change in stage but there is progression of disease  Weight loss was thought to be secondary to diet but this could be real and he has stopped [facility] herbal medications and is not on any special diet anymore  This could be an indication to treat  Discussed treatment indications with the patient  He will come back in 2 months with blood tests for evaluation  All discussed in detail  Questions answered  Discussed the importance of eating healthy foods, staying active and health screening tests  He is capable of self-care     Discussed precautions against coronavirus  1  Chronic lymphocytic leukemia of B-cell type not having achieved remission (HCC)    - CBC and differential; Standing  - Comprehensive metabolic panel; Standing  - IgG, IgA, IgM; Standing  - LD,Blood; Standing  - Uric acid; Standing  - CBC and differential  - Comprehensive metabolic panel  - IgG, IgA, IgM  - LD,Blood  - Uric acid    2  Weight loss              Patient voiced understanding and agreement in the discussion  Counseling / Coordination of Care   Greater than 50% of total time was spent with the patient and / or family counseling and / or coordination of care

## 2024-09-16 NOTE — TELEPHONE ENCOUNTER
Lab called to report that pathologist added an additional comment to CBCD done yesterday  Please update Dr Yessica Lyons  25